# Patient Record
Sex: FEMALE | Race: WHITE | NOT HISPANIC OR LATINO | URBAN - METROPOLITAN AREA
[De-identification: names, ages, dates, MRNs, and addresses within clinical notes are randomized per-mention and may not be internally consistent; named-entity substitution may affect disease eponyms.]

---

## 2018-01-23 ENCOUNTER — HOSPITAL ENCOUNTER (OUTPATIENT)
Dept: GENERAL RADIOLOGY | Facility: HOSPITAL | Age: 53
Discharge: HOME | End: 2018-01-23

## 2020-05-20 ENCOUNTER — TELEPHONE (OUTPATIENT)
Dept: PAIN MEDICINE | Facility: CLINIC | Age: 55
End: 2020-05-20

## 2020-05-20 NOTE — TELEPHONE ENCOUNTER
Pain Management denied Patient failed UDS at previous pain Management when told they would not write for narcotics but would do injections patient left and didn't return.

## 2020-10-28 ENCOUNTER — OFFICE VISIT (OUTPATIENT)
Dept: PSYCHIATRY | Facility: CLINIC | Age: 55
End: 2020-10-28

## 2020-10-28 DIAGNOSIS — F41.1 GENERALIZED ANXIETY DISORDER: ICD-10-CM

## 2020-10-28 DIAGNOSIS — F31.81 BIPOLAR II DISORDER (HCC): Primary | ICD-10-CM

## 2020-10-28 PROCEDURE — 90792 PSYCH DIAG EVAL W/MED SRVCS: CPT | Performed by: PHYSICIAN ASSISTANT

## 2020-10-28 RX ORDER — LAMOTRIGINE 100 MG/1
100 TABLET ORAL 2 TIMES DAILY
Qty: 180 TABLET | Refills: 1 | Status: SHIPPED | OUTPATIENT
Start: 2020-10-28 | End: 2021-02-15

## 2020-10-28 RX ORDER — LAMOTRIGINE 100 MG/1
100 TABLET ORAL 2 TIMES DAILY
COMMUNITY
Start: 2020-08-13 | End: 2020-10-28 | Stop reason: SDUPTHER

## 2020-10-28 RX ORDER — LORAZEPAM 0.5 MG/1
0.5 TABLET ORAL 2 TIMES DAILY PRN
Qty: 60 TABLET | Refills: 1 | Status: SHIPPED | OUTPATIENT
Start: 2020-10-28 | End: 2020-12-28 | Stop reason: SDUPTHER
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
## 2020-10-28 NOTE — PATIENT INSTRUCTIONS

## 2020-10-29 NOTE — PROGRESS NOTES
I have reviewed the notes, assessments, and/or procedures performed byEricka Noguera, I concur with her/his documentation of Soraida Valente.

## 2020-12-28 DIAGNOSIS — F41.1 GENERALIZED ANXIETY DISORDER: ICD-10-CM

## 2020-12-28 RX ORDER — LORAZEPAM 0.5 MG/1
0.5 TABLET ORAL 2 TIMES DAILY PRN
Qty: 60 TABLET | Refills: 1 | Status: SHIPPED | OUTPATIENT
Start: 2020-12-28 | End: 2021-01-25 | Stop reason: SDUPTHER

## 2021-01-12 ENCOUNTER — TELEPHONE (OUTPATIENT)
Dept: PSYCHIATRY | Facility: CLINIC | Age: 56
End: 2021-01-12

## 2021-01-25 DIAGNOSIS — F41.1 GENERALIZED ANXIETY DISORDER: ICD-10-CM

## 2021-01-25 RX ORDER — LORAZEPAM 0.5 MG/1
0.5 TABLET ORAL 2 TIMES DAILY PRN
Qty: 60 TABLET | Refills: 0 | Status: SHIPPED | OUTPATIENT
Start: 2021-01-25 | End: 2021-03-23 | Stop reason: SDUPTHER

## 2021-02-15 DIAGNOSIS — F31.81 BIPOLAR II DISORDER (HCC): ICD-10-CM

## 2021-02-15 RX ORDER — LAMOTRIGINE 100 MG/1
TABLET ORAL
Qty: 180 TABLET | Refills: 0 | Status: SHIPPED | OUTPATIENT
Start: 2021-02-15 | End: 2021-07-13

## 2021-03-23 ENCOUNTER — OFFICE VISIT (OUTPATIENT)
Dept: PSYCHIATRY | Facility: CLINIC | Age: 56
End: 2021-03-23

## 2021-03-23 DIAGNOSIS — F25.0 SCHIZOAFFECTIVE DISORDER, BIPOLAR TYPE (HCC): Primary | Chronic | ICD-10-CM

## 2021-03-23 DIAGNOSIS — F41.1 GENERALIZED ANXIETY DISORDER: Chronic | ICD-10-CM

## 2021-03-23 PROCEDURE — 99214 OFFICE O/P EST MOD 30 MIN: CPT | Performed by: PHYSICIAN ASSISTANT

## 2021-03-23 RX ORDER — HYDROXYZINE PAMOATE 50 MG/1
50 CAPSULE ORAL 4 TIMES DAILY PRN
Qty: 120 CAPSULE | Refills: 5 | Status: SHIPPED | OUTPATIENT
Start: 2021-03-23 | End: 2021-09-13

## 2021-03-23 RX ORDER — HYDROXYZINE PAMOATE 50 MG/1
50 CAPSULE ORAL 4 TIMES DAILY
COMMUNITY
Start: 2021-02-15 | End: 2021-03-23 | Stop reason: SDUPTHER

## 2021-03-23 RX ORDER — AZELASTINE HYDROCHLORIDE 0.5 MG/ML
SOLUTION/ DROPS OPHTHALMIC
COMMUNITY
Start: 2021-03-13 | End: 2022-12-01

## 2021-03-23 RX ORDER — LORAZEPAM 0.5 MG/1
0.5 TABLET ORAL 2 TIMES DAILY PRN
Qty: 60 TABLET | Refills: 2 | Status: SHIPPED | OUTPATIENT
Start: 2021-03-23 | End: 2021-06-23

## 2021-03-23 RX ORDER — ATORVASTATIN CALCIUM 10 MG/1
10 TABLET, FILM COATED ORAL
COMMUNITY
Start: 2021-03-10

## 2021-03-23 RX ORDER — OLANZAPINE 10 MG/1
10 TABLET ORAL
Qty: 90 TABLET | Refills: 1 | Status: SHIPPED | OUTPATIENT
Start: 2021-03-23 | End: 2021-05-27 | Stop reason: SDUPTHER

## 2021-03-23 RX ORDER — OLANZAPINE 10 MG/1
10 TABLET ORAL
COMMUNITY
Start: 2021-02-28 | End: 2021-03-23 | Stop reason: SDUPTHER
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
## 2021-03-25 PROBLEM — F41.1 GENERALIZED ANXIETY DISORDER: Chronic | Status: ACTIVE | Noted: 2021-03-25

## 2021-05-27 ENCOUNTER — TELEPHONE (OUTPATIENT)
Dept: PSYCHIATRY | Facility: CLINIC | Age: 56
End: 2021-05-27

## 2021-05-27 DIAGNOSIS — F25.0 SCHIZOAFFECTIVE DISORDER, BIPOLAR TYPE (HCC): Chronic | ICD-10-CM

## 2021-05-27 RX ORDER — OLANZAPINE 10 MG/1
10 TABLET ORAL
Qty: 90 TABLET | Refills: 1 | Status: SHIPPED | OUTPATIENT
Start: 2021-05-27 | End: 2022-12-01

## 2021-06-21 DIAGNOSIS — F41.1 GENERALIZED ANXIETY DISORDER: Chronic | ICD-10-CM

## 2021-06-23 RX ORDER — LORAZEPAM 0.5 MG/1
0.5 TABLET ORAL 2 TIMES DAILY PRN
Qty: 60 TABLET | Refills: 1 | Status: SHIPPED | OUTPATIENT
Start: 2021-06-23 | End: 2021-08-11 | Stop reason: SDUPTHER

## 2021-07-13 DIAGNOSIS — F31.81 BIPOLAR II DISORDER (HCC): ICD-10-CM

## 2021-07-13 RX ORDER — LAMOTRIGINE 100 MG/1
TABLET ORAL
Qty: 180 TABLET | Refills: 0 | Status: SHIPPED | OUTPATIENT
Start: 2021-07-13 | End: 2022-12-01

## 2021-08-09 DIAGNOSIS — F31.81 BIPOLAR II DISORDER (HCC): ICD-10-CM

## 2021-08-09 RX ORDER — LAMOTRIGINE 100 MG/1
TABLET ORAL
Qty: 180 TABLET | Refills: 0 | OUTPATIENT
Start: 2021-08-09

## 2021-08-09 NOTE — TELEPHONE ENCOUNTER
Refill was sent for 90 days supply three wks ago, plus the patient does not have an appointment for follow up

## 2021-08-11 DIAGNOSIS — F41.1 GENERALIZED ANXIETY DISORDER: Chronic | ICD-10-CM

## 2021-08-11 RX ORDER — LORAZEPAM 0.5 MG/1
0.5 TABLET ORAL 2 TIMES DAILY PRN
Qty: 60 TABLET | Refills: 1 | Status: SHIPPED | OUTPATIENT
Start: 2021-08-11 | End: 2022-12-01

## 2021-09-10 DIAGNOSIS — F41.1 GENERALIZED ANXIETY DISORDER: Chronic | ICD-10-CM

## 2021-09-13 RX ORDER — HYDROXYZINE PAMOATE 50 MG/1
50 CAPSULE ORAL 4 TIMES DAILY PRN
Qty: 360 CAPSULE | Refills: 1 | Status: SHIPPED | OUTPATIENT
Start: 2021-09-13 | End: 2022-12-01

## 2021-09-30 DIAGNOSIS — F25.0 SCHIZOAFFECTIVE DISORDER, BIPOLAR TYPE (HCC): Chronic | ICD-10-CM

## 2021-09-30 RX ORDER — OLANZAPINE 10 MG/1
TABLET ORAL
Qty: 90 TABLET | Refills: 1 | OUTPATIENT
Start: 2021-09-30

## 2021-11-23 DIAGNOSIS — F41.1 GENERALIZED ANXIETY DISORDER: Chronic | ICD-10-CM

## 2021-11-25 RX ORDER — LORAZEPAM 0.5 MG/1
0.5 TABLET ORAL 2 TIMES DAILY PRN
Qty: 60 TABLET | Refills: 1 | OUTPATIENT
Start: 2021-11-25

## 2021-11-25 NOTE — TELEPHONE ENCOUNTER
Lorazepam refill denied.  She was last seen in March 2021 and no showed one appt and then canceled the next 4 appts.  She must be seen for further refills.

## 2021-11-29 DIAGNOSIS — F41.1 GENERALIZED ANXIETY DISORDER: Chronic | ICD-10-CM

## 2021-11-29 RX ORDER — LORAZEPAM 0.5 MG/1
0.5 TABLET ORAL 2 TIMES DAILY PRN
Qty: 60 TABLET | Refills: 1 | OUTPATIENT
Start: 2021-11-29

## 2021-12-01 DIAGNOSIS — F41.1 GENERALIZED ANXIETY DISORDER: Chronic | ICD-10-CM

## 2021-12-01 RX ORDER — LORAZEPAM 0.5 MG/1
0.5 TABLET ORAL 2 TIMES DAILY PRN
Qty: 60 TABLET | Refills: 1 | OUTPATIENT
Start: 2021-12-01

## 2021-12-01 NOTE — TELEPHONE ENCOUNTER
Please call this patient and let her know that she can stop requesting refills of the Lorazepam, getting repeated requests, until she is seen.  She has not been seen since March and no showed for one appt and canceled three others.  She does not currently have a follow up scheduled.  I am not refilling anything controlled until she comes in for an appt.

## 2022-11-30 ENCOUNTER — APPOINTMENT (OUTPATIENT)
Dept: GENERAL RADIOLOGY | Facility: HOSPITAL | Age: 57
End: 2022-11-30

## 2022-11-30 ENCOUNTER — APPOINTMENT (OUTPATIENT)
Dept: CT IMAGING | Facility: HOSPITAL | Age: 57
End: 2022-11-30

## 2022-11-30 ENCOUNTER — HOSPITAL ENCOUNTER (INPATIENT)
Facility: HOSPITAL | Age: 57
LOS: 1 days | Discharge: PSYCHIATRIC OR SUBSTANCE USE FACILITY | End: 2022-12-02
Attending: STUDENT IN AN ORGANIZED HEALTH CARE EDUCATION/TRAINING PROGRAM | Admitting: HOSPITALIST

## 2022-11-30 DIAGNOSIS — E87.6 HYPOKALEMIA: ICD-10-CM

## 2022-11-30 DIAGNOSIS — F15.10 AMPHETAMINE ABUSE: ICD-10-CM

## 2022-11-30 DIAGNOSIS — R41.82 ALTERED MENTAL STATUS, UNSPECIFIED ALTERED MENTAL STATUS TYPE: Primary | ICD-10-CM

## 2022-11-30 LAB
AMPHET+METHAMPHET UR QL: POSITIVE
ANION GAP SERPL CALCULATED.3IONS-SCNC: 15 MMOL/L (ref 5–15)
APAP SERPL-MCNC: <5 MCG/ML (ref 0–30)
BARBITURATES UR QL SCN: NEGATIVE
BASOPHILS # BLD AUTO: 0 10*3/MM3 (ref 0–0.2)
BASOPHILS NFR BLD AUTO: 0.5 % (ref 0–1.5)
BENZODIAZ UR QL SCN: NEGATIVE
BILIRUB UR QL STRIP: NEGATIVE
BUN SERPL-MCNC: 6 MG/DL (ref 6–20)
BUN/CREAT SERPL: 8.5 (ref 7–25)
CALCIUM SPEC-SCNC: 9.3 MG/DL (ref 8.6–10.5)
CANNABINOIDS SERPL QL: NEGATIVE
CHLORIDE SERPL-SCNC: 92 MMOL/L (ref 98–107)
CLARITY UR: CLEAR
CO2 SERPL-SCNC: 23 MMOL/L (ref 22–29)
COCAINE UR QL: NEGATIVE
COLOR UR: YELLOW
CREAT SERPL-MCNC: 0.71 MG/DL (ref 0.57–1)
DEPRECATED RDW RBC AUTO: 42.4 FL (ref 37–54)
EGFRCR SERPLBLD CKD-EPI 2021: 98.1 ML/MIN/1.73
EOSINOPHIL # BLD AUTO: 0.1 10*3/MM3 (ref 0–0.4)
EOSINOPHIL NFR BLD AUTO: 0.8 % (ref 0.3–6.2)
ERYTHROCYTE [DISTWIDTH] IN BLOOD BY AUTOMATED COUNT: 13.6 % (ref 12.3–15.4)
ETHANOL UR QL: <0.01 %
GLUCOSE SERPL-MCNC: 112 MG/DL (ref 65–99)
GLUCOSE UR STRIP-MCNC: NEGATIVE MG/DL
HCT VFR BLD AUTO: 40.1 % (ref 34–46.6)
HGB BLD-MCNC: 13.2 G/DL (ref 12–15.9)
HGB UR QL STRIP.AUTO: NEGATIVE
KETONES UR QL STRIP: NEGATIVE
LEUKOCYTE ESTERASE UR QL STRIP.AUTO: NEGATIVE
LYMPHOCYTES # BLD AUTO: 1.7 10*3/MM3 (ref 0.7–3.1)
LYMPHOCYTES NFR BLD AUTO: 18.7 % (ref 19.6–45.3)
MCH RBC QN AUTO: 30 PG (ref 26.6–33)
MCHC RBC AUTO-ENTMCNC: 33 G/DL (ref 31.5–35.7)
MCV RBC AUTO: 91 FL (ref 79–97)
METHADONE UR QL SCN: NEGATIVE
MONOCYTES # BLD AUTO: 0.9 10*3/MM3 (ref 0.1–0.9)
MONOCYTES NFR BLD AUTO: 9.9 % (ref 5–12)
NEUTROPHILS NFR BLD AUTO: 6.3 10*3/MM3 (ref 1.7–7)
NEUTROPHILS NFR BLD AUTO: 70.1 % (ref 42.7–76)
NITRITE UR QL STRIP: NEGATIVE
NRBC BLD AUTO-RTO: 0 /100 WBC (ref 0–0.2)
OPIATES UR QL: NEGATIVE
OXYCODONE UR QL SCN: NEGATIVE
PH UR STRIP.AUTO: 7 [PH] (ref 5–8)
PLATELET # BLD AUTO: 307 10*3/MM3 (ref 140–450)
PMV BLD AUTO: 7.8 FL (ref 6–12)
POTASSIUM SERPL-SCNC: 2.9 MMOL/L (ref 3.5–5.2)
PROT UR QL STRIP: NEGATIVE
RBC # BLD AUTO: 4.41 10*6/MM3 (ref 3.77–5.28)
SALICYLATES SERPL-MCNC: 2 MG/DL
SODIUM SERPL-SCNC: 130 MMOL/L (ref 136–145)
SP GR UR STRIP: <=1.005 (ref 1–1.03)
UROBILINOGEN UR QL STRIP: NORMAL
WBC NRBC COR # BLD: 9 10*3/MM3 (ref 3.4–10.8)

## 2022-11-30 PROCEDURE — 81003 URINALYSIS AUTO W/O SCOPE: CPT | Performed by: EMERGENCY MEDICINE

## 2022-11-30 PROCEDURE — 80307 DRUG TEST PRSMV CHEM ANLYZR: CPT | Performed by: EMERGENCY MEDICINE

## 2022-11-30 PROCEDURE — 74018 RADEX ABDOMEN 1 VIEW: CPT

## 2022-11-30 PROCEDURE — G0378 HOSPITAL OBSERVATION PER HR: HCPCS

## 2022-11-30 PROCEDURE — 99285 EMERGENCY DEPT VISIT HI MDM: CPT

## 2022-11-30 PROCEDURE — 25010000002 ONDANSETRON PER 1 MG: Performed by: NURSE PRACTITIONER

## 2022-11-30 PROCEDURE — 0 POTASSIUM CHLORIDE 10 MEQ/100ML SOLUTION: Performed by: NURSE PRACTITIONER

## 2022-11-30 PROCEDURE — 85025 COMPLETE CBC W/AUTO DIFF WBC: CPT | Performed by: EMERGENCY MEDICINE

## 2022-11-30 PROCEDURE — 80179 DRUG ASSAY SALICYLATE: CPT | Performed by: EMERGENCY MEDICINE

## 2022-11-30 PROCEDURE — 80048 BASIC METABOLIC PNL TOTAL CA: CPT | Performed by: EMERGENCY MEDICINE

## 2022-11-30 PROCEDURE — 80143 DRUG ASSAY ACETAMINOPHEN: CPT | Performed by: EMERGENCY MEDICINE

## 2022-11-30 PROCEDURE — 82077 ASSAY SPEC XCP UR&BREATH IA: CPT | Performed by: EMERGENCY MEDICINE

## 2022-11-30 PROCEDURE — 93005 ELECTROCARDIOGRAM TRACING: CPT

## 2022-11-30 PROCEDURE — 70450 CT HEAD/BRAIN W/O DYE: CPT

## 2022-11-30 PROCEDURE — 51701 INSERT BLADDER CATHETER: CPT

## 2022-11-30 RX ORDER — POTASSIUM CHLORIDE 7.45 MG/ML
10 INJECTION INTRAVENOUS
Status: DISCONTINUED | OUTPATIENT
Start: 2022-11-30 | End: 2022-12-02 | Stop reason: HOSPADM

## 2022-11-30 RX ORDER — NITROGLYCERIN 0.4 MG/1
0.4 TABLET SUBLINGUAL
Status: DISCONTINUED | OUTPATIENT
Start: 2022-11-30 | End: 2022-12-02 | Stop reason: HOSPADM

## 2022-11-30 RX ORDER — POTASSIUM CHLORIDE 1.5 G/1.77G
40 POWDER, FOR SOLUTION ORAL AS NEEDED
Status: DISCONTINUED | OUTPATIENT
Start: 2022-11-30 | End: 2022-12-02 | Stop reason: HOSPADM

## 2022-11-30 RX ORDER — DEXTROSE, SODIUM CHLORIDE, AND POTASSIUM CHLORIDE 5; .45; .3 G/100ML; G/100ML; G/100ML
125 INJECTION INTRAVENOUS CONTINUOUS
Status: DISCONTINUED | OUTPATIENT
Start: 2022-11-30 | End: 2022-12-02 | Stop reason: HOSPADM

## 2022-11-30 RX ORDER — SODIUM CHLORIDE 0.9 % (FLUSH) 0.9 %
10 SYRINGE (ML) INJECTION EVERY 12 HOURS SCHEDULED
Status: DISCONTINUED | OUTPATIENT
Start: 2022-11-30 | End: 2022-12-02 | Stop reason: HOSPADM

## 2022-11-30 RX ORDER — SODIUM CHLORIDE 0.9 % (FLUSH) 0.9 %
10 SYRINGE (ML) INJECTION AS NEEDED
Status: DISCONTINUED | OUTPATIENT
Start: 2022-11-30 | End: 2022-12-02 | Stop reason: HOSPADM

## 2022-11-30 RX ORDER — ONDANSETRON 2 MG/ML
4 INJECTION INTRAMUSCULAR; INTRAVENOUS EVERY 6 HOURS PRN
Status: DISCONTINUED | OUTPATIENT
Start: 2022-11-30 | End: 2022-12-02 | Stop reason: HOSPADM

## 2022-11-30 RX ORDER — POTASSIUM CHLORIDE 20 MEQ/1
40 TABLET, EXTENDED RELEASE ORAL AS NEEDED
Status: DISCONTINUED | OUTPATIENT
Start: 2022-11-30 | End: 2022-12-02 | Stop reason: HOSPADM

## 2022-11-30 RX ORDER — SODIUM CHLORIDE 9 MG/ML
40 INJECTION, SOLUTION INTRAVENOUS AS NEEDED
Status: DISCONTINUED | OUTPATIENT
Start: 2022-11-30 | End: 2022-12-02 | Stop reason: HOSPADM

## 2022-11-30 RX ADMIN — POTASSIUM CHLORIDE 10 MEQ: 7.46 INJECTION, SOLUTION INTRAVENOUS at 22:48

## 2022-11-30 RX ADMIN — DEXTROSE MONOHYDRATE, SODIUM CHLORIDE, AND POTASSIUM CHLORIDE 125 ML/HR: 50; 4.5; 2.98 INJECTION, SOLUTION INTRAVENOUS at 21:46

## 2022-11-30 RX ADMIN — POTASSIUM CHLORIDE 10 MEQ: 7.46 INJECTION, SOLUTION INTRAVENOUS at 23:56

## 2022-11-30 RX ADMIN — ONDANSETRON 4 MG: 2 INJECTION INTRAMUSCULAR; INTRAVENOUS at 22:48

## 2022-12-01 ENCOUNTER — APPOINTMENT (OUTPATIENT)
Dept: GENERAL RADIOLOGY | Facility: HOSPITAL | Age: 57
End: 2022-12-01

## 2022-12-01 LAB
ALBUMIN SERPL-MCNC: 4.2 G/DL (ref 3.5–5.2)
ALBUMIN/GLOB SERPL: 1.8 G/DL
ALP SERPL-CCNC: 112 U/L (ref 39–117)
ALT SERPL W P-5'-P-CCNC: 17 U/L (ref 1–33)
ANION GAP SERPL CALCULATED.3IONS-SCNC: 10 MMOL/L (ref 5–15)
ANION GAP SERPL CALCULATED.3IONS-SCNC: 10 MMOL/L (ref 5–15)
AST SERPL-CCNC: 22 U/L (ref 1–32)
BASOPHILS # BLD AUTO: 0 10*3/MM3 (ref 0–0.2)
BASOPHILS NFR BLD AUTO: 0.4 % (ref 0–1.5)
BILIRUB SERPL-MCNC: 0.5 MG/DL (ref 0–1.2)
BUN SERPL-MCNC: 4 MG/DL (ref 6–20)
BUN SERPL-MCNC: 5 MG/DL (ref 6–20)
BUN/CREAT SERPL: 7.1 (ref 7–25)
BUN/CREAT SERPL: 8.3 (ref 7–25)
CALCIUM SPEC-SCNC: 8.9 MG/DL (ref 8.6–10.5)
CALCIUM SPEC-SCNC: 9 MG/DL (ref 8.6–10.5)
CHLORIDE SERPL-SCNC: 103 MMOL/L (ref 98–107)
CHLORIDE SERPL-SCNC: 99 MMOL/L (ref 98–107)
CO2 SERPL-SCNC: 24 MMOL/L (ref 22–29)
CO2 SERPL-SCNC: 25 MMOL/L (ref 22–29)
CREAT SERPL-MCNC: 0.56 MG/DL (ref 0.57–1)
CREAT SERPL-MCNC: 0.6 MG/DL (ref 0.57–1)
DEPRECATED RDW RBC AUTO: 42.4 FL (ref 37–54)
EGFRCR SERPLBLD CKD-EPI 2021: 103.5 ML/MIN/1.73
EGFRCR SERPLBLD CKD-EPI 2021: 105.3 ML/MIN/1.73
EOSINOPHIL # BLD AUTO: 0.1 10*3/MM3 (ref 0–0.4)
EOSINOPHIL NFR BLD AUTO: 1.8 % (ref 0.3–6.2)
ERYTHROCYTE [DISTWIDTH] IN BLOOD BY AUTOMATED COUNT: 13.6 % (ref 12.3–15.4)
GLOBULIN UR ELPH-MCNC: 2.3 GM/DL
GLUCOSE SERPL-MCNC: 117 MG/DL (ref 65–99)
GLUCOSE SERPL-MCNC: 122 MG/DL (ref 65–99)
HCT VFR BLD AUTO: 39.6 % (ref 34–46.6)
HGB BLD-MCNC: 13.2 G/DL (ref 12–15.9)
LYMPHOCYTES # BLD AUTO: 1.3 10*3/MM3 (ref 0.7–3.1)
LYMPHOCYTES NFR BLD AUTO: 22.6 % (ref 19.6–45.3)
MCH RBC QN AUTO: 30.3 PG (ref 26.6–33)
MCHC RBC AUTO-ENTMCNC: 33.3 G/DL (ref 31.5–35.7)
MCV RBC AUTO: 91.2 FL (ref 79–97)
MONOCYTES # BLD AUTO: 0.7 10*3/MM3 (ref 0.1–0.9)
MONOCYTES NFR BLD AUTO: 11.3 % (ref 5–12)
NEUTROPHILS NFR BLD AUTO: 3.7 10*3/MM3 (ref 1.7–7)
NEUTROPHILS NFR BLD AUTO: 63.9 % (ref 42.7–76)
NRBC BLD AUTO-RTO: 0.1 /100 WBC (ref 0–0.2)
PLATELET # BLD AUTO: 287 10*3/MM3 (ref 140–450)
PMV BLD AUTO: 8.1 FL (ref 6–12)
POTASSIUM SERPL-SCNC: 4.4 MMOL/L (ref 3.5–5.2)
POTASSIUM SERPL-SCNC: 4.5 MMOL/L (ref 3.5–5.2)
PROT SERPL-MCNC: 6.5 G/DL (ref 6–8.5)
RBC # BLD AUTO: 4.34 10*6/MM3 (ref 3.77–5.28)
SODIUM SERPL-SCNC: 134 MMOL/L (ref 136–145)
SODIUM SERPL-SCNC: 137 MMOL/L (ref 136–145)
WBC NRBC COR # BLD: 5.8 10*3/MM3 (ref 3.4–10.8)

## 2022-12-01 PROCEDURE — 25010000002 LORAZEPAM PER 2 MG

## 2022-12-01 PROCEDURE — 80053 COMPREHEN METABOLIC PANEL: CPT | Performed by: NURSE PRACTITIONER

## 2022-12-01 PROCEDURE — 36415 COLL VENOUS BLD VENIPUNCTURE: CPT | Performed by: NURSE PRACTITIONER

## 2022-12-01 PROCEDURE — 99223 1ST HOSP IP/OBS HIGH 75: CPT

## 2022-12-01 PROCEDURE — 0 POTASSIUM CHLORIDE 10 MEQ/100ML SOLUTION: Performed by: NURSE PRACTITIONER

## 2022-12-01 PROCEDURE — G0378 HOSPITAL OBSERVATION PER HR: HCPCS

## 2022-12-01 PROCEDURE — 85025 COMPLETE CBC W/AUTO DIFF WBC: CPT | Performed by: NURSE PRACTITIONER

## 2022-12-01 RX ORDER — METOPROLOL TARTRATE 37.5 MG/1
1 TABLET, FILM COATED ORAL 2 TIMES DAILY
COMMUNITY

## 2022-12-01 RX ORDER — OLANZAPINE 5 MG/1
20 TABLET ORAL 2 TIMES DAILY
Status: DISCONTINUED | OUTPATIENT
Start: 2022-12-01 | End: 2022-12-01 | Stop reason: SDUPTHER

## 2022-12-01 RX ORDER — OXYBUTYNIN CHLORIDE 5 MG/1
5 TABLET ORAL DAILY
Status: DISCONTINUED | OUTPATIENT
Start: 2022-12-01 | End: 2022-12-02 | Stop reason: HOSPADM

## 2022-12-01 RX ORDER — PANTOPRAZOLE SODIUM 40 MG/1
40 TABLET, DELAYED RELEASE ORAL EVERY MORNING
Status: DISCONTINUED | OUTPATIENT
Start: 2022-12-02 | End: 2022-12-02 | Stop reason: HOSPADM

## 2022-12-01 RX ORDER — OMEPRAZOLE 20 MG/1
20 CAPSULE, DELAYED RELEASE ORAL DAILY
COMMUNITY

## 2022-12-01 RX ORDER — LORAZEPAM 1 MG/1
1 TABLET ORAL 3 TIMES DAILY PRN
COMMUNITY
End: 2022-12-02

## 2022-12-01 RX ORDER — LORAZEPAM 2 MG/ML
1 INJECTION INTRAMUSCULAR ONCE
Status: COMPLETED | OUTPATIENT
Start: 2022-12-01 | End: 2022-12-01

## 2022-12-01 RX ORDER — ATORVASTATIN CALCIUM 10 MG/1
10 TABLET, FILM COATED ORAL NIGHTLY
Status: DISCONTINUED | OUTPATIENT
Start: 2022-12-01 | End: 2022-12-02 | Stop reason: HOSPADM

## 2022-12-01 RX ORDER — LOSARTAN POTASSIUM 50 MG/1
50 TABLET ORAL
Status: DISCONTINUED | OUTPATIENT
Start: 2022-12-01 | End: 2022-12-02 | Stop reason: HOSPADM

## 2022-12-01 RX ORDER — LORAZEPAM 1 MG/1
1 TABLET ORAL 3 TIMES DAILY PRN
Status: DISCONTINUED | OUTPATIENT
Start: 2022-12-01 | End: 2022-12-02

## 2022-12-01 RX ORDER — OLANZAPINE 5 MG/1
20 TABLET ORAL 2 TIMES DAILY
Status: DISCONTINUED | OUTPATIENT
Start: 2022-12-01 | End: 2022-12-02 | Stop reason: HOSPADM

## 2022-12-01 RX ORDER — OLANZAPINE 5 MG/1
5 TABLET, ORALLY DISINTEGRATING ORAL 2 TIMES DAILY
Status: DISCONTINUED | OUTPATIENT
Start: 2022-12-01 | End: 2022-12-01

## 2022-12-01 RX ORDER — RISPERIDONE 120 MG
120 KIT SUBCUTANEOUS
COMMUNITY
End: 2022-12-02 | Stop reason: HOSPADM

## 2022-12-01 RX ORDER — HYDROCHLOROTHIAZIDE 12.5 MG/1
12.5 TABLET ORAL
Status: DISCONTINUED | OUTPATIENT
Start: 2022-12-01 | End: 2022-12-02 | Stop reason: HOSPADM

## 2022-12-01 RX ORDER — LORAZEPAM 1 MG/1
1 TABLET ORAL ONCE
Status: COMPLETED | OUTPATIENT
Start: 2022-12-01 | End: 2022-12-01

## 2022-12-01 RX ORDER — LAMOTRIGINE 100 MG/1
100 TABLET ORAL 2 TIMES DAILY
COMMUNITY
End: 2022-12-02 | Stop reason: HOSPADM

## 2022-12-01 RX ORDER — ASPIRIN 81 MG/1
81 TABLET, CHEWABLE ORAL DAILY
Status: CANCELLED | OUTPATIENT
Start: 2022-12-01

## 2022-12-01 RX ORDER — HYDROXYZINE PAMOATE 50 MG/1
50 CAPSULE ORAL 3 TIMES DAILY PRN
COMMUNITY
End: 2022-12-02 | Stop reason: HOSPADM

## 2022-12-01 RX ORDER — BUDESONIDE AND FORMOTEROL FUMARATE DIHYDRATE 80; 4.5 UG/1; UG/1
2 AEROSOL RESPIRATORY (INHALATION)
Status: DISCONTINUED | OUTPATIENT
Start: 2022-12-01 | End: 2022-12-02 | Stop reason: HOSPADM

## 2022-12-01 RX ORDER — OXYBUTYNIN CHLORIDE 5 MG/1
5 TABLET ORAL DAILY
COMMUNITY

## 2022-12-01 RX ORDER — LAMOTRIGINE 100 MG/1
100 TABLET ORAL 2 TIMES DAILY
Status: DISCONTINUED | OUTPATIENT
Start: 2022-12-01 | End: 2022-12-01

## 2022-12-01 RX ORDER — LOSARTAN POTASSIUM AND HYDROCHLOROTHIAZIDE 12.5; 5 MG/1; MG/1
1 TABLET ORAL DAILY
COMMUNITY

## 2022-12-01 RX ORDER — OLANZAPINE 20 MG/1
20 TABLET ORAL 2 TIMES DAILY
COMMUNITY
End: 2023-03-01

## 2022-12-01 RX ADMIN — Medication 10 ML: at 20:55

## 2022-12-01 RX ADMIN — OLANZAPINE 20 MG: 5 TABLET, FILM COATED ORAL at 20:55

## 2022-12-01 RX ADMIN — LOSARTAN POTASSIUM 50 MG: 50 TABLET, FILM COATED ORAL at 18:39

## 2022-12-01 RX ADMIN — LORAZEPAM 1 MG: 1 TABLET ORAL at 05:39

## 2022-12-01 RX ADMIN — LORAZEPAM 1 MG: 1 TABLET ORAL at 20:54

## 2022-12-01 RX ADMIN — OXYBUTYNIN CHLORIDE 5 MG: 5 TABLET ORAL at 18:39

## 2022-12-01 RX ADMIN — DEXTROSE MONOHYDRATE, SODIUM CHLORIDE, AND POTASSIUM CHLORIDE 125 ML/HR: 50; 4.5; 2.98 INJECTION, SOLUTION INTRAVENOUS at 15:26

## 2022-12-01 RX ADMIN — ATORVASTATIN CALCIUM 10 MG: 10 TABLET, FILM COATED ORAL at 20:55

## 2022-12-01 RX ADMIN — LORAZEPAM 1 MG: 2 INJECTION INTRAMUSCULAR; INTRAVENOUS at 15:26

## 2022-12-01 RX ADMIN — HYDROCHLOROTHIAZIDE 12.5 MG: 12.5 TABLET ORAL at 18:39

## 2022-12-01 RX ADMIN — DEXTROSE MONOHYDRATE, SODIUM CHLORIDE, AND POTASSIUM CHLORIDE 125 ML/HR: 50; 4.5; 2.98 INJECTION, SOLUTION INTRAVENOUS at 06:28

## 2022-12-01 RX ADMIN — POTASSIUM CHLORIDE 10 MEQ: 7.46 INJECTION, SOLUTION INTRAVENOUS at 03:35

## 2022-12-01 RX ADMIN — METOPROLOL TARTRATE 25 MG: 25 TABLET, FILM COATED ORAL at 20:55

## 2022-12-01 RX ADMIN — POTASSIUM CHLORIDE 10 MEQ: 7.46 INJECTION, SOLUTION INTRAVENOUS at 01:25

## 2022-12-01 RX ADMIN — POTASSIUM CHLORIDE 10 MEQ: 7.46 INJECTION, SOLUTION INTRAVENOUS at 02:29

## 2022-12-01 NOTE — ED PROVIDER NOTES
Subjective   History of Present Illness  Patient is a 57-year-old female called EMS because of anxiety.  She told EMS that around the first of every month becomes extremely anxious because of the time to do her bills.  When she first arrived to the ED and talk to the nurse she was versive.  However when I went to evaluate the patient she is obtunded and responsive only to painful stimuli.        Review of Systems  Unable to be obtained due to the patient's condition  Past Medical History:   Diagnosis Date   • ADHD    • Anxiety    • Ataxia     Gait Ataxia   • Chronic fatigue    • Chronic insomnia    • Chronic pain syndrome    • COPD (chronic obstructive pulmonary disease) (CMS/[facility])    • Depression    • Fibromyalgia    • Gender identity disorder    • H/O degenerative disc disease    • Hypertension    • Lumbago    • Osteoarthritis    • Schizoaffective disorder (CMS/[facility])    • Vitamin D deficiency        Allergies   Allergen Reactions   • Dimethyl Fumarate Swelling   • Gabapentin Mental Status Change   • Ibuprofen Nausea Only   • Tolmetin Hives       Past Surgical History:   Procedure Laterality Date   • ANKLE OPEN REDUCTION INTERNAL FIXATION  2018   • HYSTERECTOMY  1991       Family History   Problem Relation Age of Onset   • Anxiety disorder Mother    • Depression Mother        Social History     Socioeconomic History   • Marital status: Single   Tobacco Use   • Smoking status: Every Day     Packs/day: 2.00     Types: Cigarettes   • Smokeless tobacco: Never   Substance and Sexual Activity   • Alcohol use: Not Currently   • Drug use: Not Currently     Comment: Tried THC once   • Sexual activity: Defer           Objective   Physical Exam  Neurologic exam shows the patient be attended.  She has no focal neurologic deficits.  HEENT exam shows TMs to be clear.  Oropharynx comers but sclera is nonicteric.  Neck has no adenopathy JVD or bruits.  Lungs are clear.  Heart has a regular rate rhythm without murmur rub or gallop.   Chest is nontender.  Abdomen is soft nontender.  Extremity exam is unremarkable.  Procedures     My EKG interpretation shows sinus tachycardia at a rate of 110 with no acute ST change      ED Course           Results for orders placed or performed during the hospital encounter of 11/30/22   Basic Metabolic Panel    Specimen: Blood   Result Value Ref Range    Glucose 112 (H) 65 - 99 mg/dL    BUN 6 6 - 20 mg/dL    Creatinine 0.71 0.57 - 1.00 mg/dL    Sodium 130 (L) 136 - 145 mmol/L    Potassium 2.9 (L) 3.5 - 5.2 mmol/L    Chloride 92 (L) 98 - 107 mmol/L    CO2 23.0 22.0 - 29.0 mmol/L    Calcium 9.3 8.6 - 10.5 mg/dL    BUN/Creatinine Ratio 8.5 7.0 - 25.0    Anion Gap 15.0 5.0 - 15.0 mmol/L    eGFR 98.1 >60.0 mL/min/1.73   Acetaminophen Level    Specimen: Blood   Result Value Ref Range    Acetaminophen <5.0 0.0 - 30.0 mcg/mL   Ethanol    Specimen: Blood   Result Value Ref Range    Ethanol % <0.010 %   Urine Drug Screen - Urine, Clean Catch    Specimen: Urine, Clean Catch   Result Value Ref Range    Amphet/Methamphet, Screen Positive (A) Negative    Barbiturates Screen, Urine Negative Negative    Benzodiazepine Screen, Urine Negative Negative    Cocaine Screen, Urine Negative Negative    Opiate Screen Negative Negative    THC, Screen, Urine Negative Negative    Methadone Screen, Urine Negative Negative    Oxycodone Screen, Urine Negative Negative   Salicylate Level    Specimen: Blood   Result Value Ref Range    Salicylate 2.0 <=30.0 mg/dL   Urinalysis With Microscopic If Indicated (No Culture) - Straight Cath    Specimen: Straight Cath; Urine   Result Value Ref Range    Color, UA Yellow Yellow, Straw    Appearance, UA Clear Clear    pH, UA 7.0 5.0 - 8.0    Specific Gravity, UA <=1.005 1.005 - 1.030    Glucose, UA Negative Negative    Ketones, UA Negative Negative    Bilirubin, UA Negative Negative    Blood, UA Negative Negative    Protein, UA Negative Negative    Leuk Esterase, UA Negative Negative    Nitrite, UA  Negative Negative    Urobilinogen, UA 0.2 E.U./dL 0.2 - 1.0 E.U./dL   CBC Auto Differential    Specimen: Blood   Result Value Ref Range    WBC 9.00 3.40 - 10.80 10*3/mm3    RBC 4.41 3.77 - 5.28 10*6/mm3    Hemoglobin 13.2 12.0 - 15.9 g/dL    Hematocrit 40.1 34.0 - 46.6 %    MCV 91.0 79.0 - 97.0 fL    MCH 30.0 26.6 - 33.0 pg    MCHC 33.0 31.5 - 35.7 g/dL    RDW 13.6 12.3 - 15.4 %    RDW-SD 42.4 37.0 - 54.0 fl    MPV 7.8 6.0 - 12.0 fL    Platelets 307 140 - 450 10*3/mm3    Neutrophil % 70.1 42.7 - 76.0 %    Lymphocyte % 18.7 (L) 19.6 - 45.3 %    Monocyte % 9.9 5.0 - 12.0 %    Eosinophil % 0.8 0.3 - 6.2 %    Basophil % 0.5 0.0 - 1.5 %    Neutrophils, Absolute 6.30 1.70 - 7.00 10*3/mm3    Lymphocytes, Absolute 1.70 0.70 - 3.10 10*3/mm3    Monocytes, Absolute 0.90 0.10 - 0.90 10*3/mm3    Eosinophils, Absolute 0.10 0.00 - 0.40 10*3/mm3    Basophils, Absolute 0.00 0.00 - 0.20 10*3/mm3    nRBC 0.0 0.0 - 0.2 /100 WBC   ECG 12 Lead Chest Pain   Result Value Ref Range    QT Interval 306 ms     CT Head Without Contrast    Result Date: 11/30/2022   1. No acute intracranial abnormality 2. Asymmetric positioning of the mandibular condyles, evaluate for TMJ dysfunction or dislocation  Electronically Signed By-Will Gilman On:11/30/2022 8:09 PM This report was finalized on [id number] by  Will Gilman, .                                    MDM  Number of Diagnoses or Management Options  Diagnosis management comments: Patient has nonfocal neurologic exam.  CT scan of head without contrast was normal.  Structuring was positive for amphetamines.  Patient was negative for alcohol.  Metabolic panel is at baseline other than hypokalemia.  Patient will be started IV fluids with potassium replacement.  She will be brought to hospital for observation for probable drug overdose.  Speak to the on-call hospitalist.  Total critical care time 45 minutes       Amount and/or Complexity of Data Reviewed  Clinical lab tests: reviewed  Tests in  the radiology section of CPT®: reviewed  Tests in the medicine section of CPT®: reviewed    Risk of Complications, Morbidity, and/or Mortality  Presenting problems: high  Diagnostic procedures: high  Management options: high    Patient Progress  Patient progress: stable      Final diagnoses:   Altered mental status, unspecified altered mental status type   Amphetamine abuse (HCC)   Hypokalemia       ED Disposition  ED Disposition     ED Disposition   Decision to Admit    Condition   --    Comment   --             No follow-up provider specified.       Medication List      No changes were made to your prescriptions during this visit.          Robbin Bernal MD  11/30/22 2050

## 2022-12-01 NOTE — CASE MANAGEMENT/SOCIAL WORK
Social Work Assessment  [facility]     Patient Name: Soraida Valente  MRN: [id number]  Today's Date: 12/1/2022    Admit Date: 11/30/2022       Discharge Plan     Row Name 12/01/22 1600       Plan    Plan D/C Plan: IP Psych. [facility] Order (72 hours) initated 12/01/22 at 4:02pm (expires 12/06/22 at 4:01pm) anticipate [facility] referral once medically stable.    Plan Comments Consulted with psych APRN and patient will be on a 72 hour hold. LSW met with patient bedside. Patient was very anxious and crying while speaking to LSW. Patient has suicidal ideations and a suicide plan. Discussed w/ pt. level of care needs.  Patient initially stated she did not want to go to [facility], we confirmed she will go there depending on acceptance and the attending. Permission obtained to speak to emergency contact. Copy of LINDSAY faxed to unit 3C for hard chart.                Met with patient in room wearing PPE: mask, face shield/goggles, gloves, gown.    Maintained distance greater than six feet and spent less than 15 minutes in the room.      TONI Dee, MSW    Phone: [phone]  Cell: [phone]  Fax: [phone]  [email or web address]

## 2022-12-01 NOTE — PLAN OF CARE
Goal Outcome Evaluation:  Plan of Care Reviewed With: patient           Outcome Evaluation: pt has had no complaints since being on the floor. pt in bed resting. will continue to monitor

## 2022-12-01 NOTE — ED NOTES
Answered pts call light and ambulated to [facility]. Pt reported SI with plan. I packed pts clothing (shirts, jacket, pants, belt, shoes) in a belonging bag with a pt sticker and gave items to security to be locked up in the closet. Hold nurse made aware.

## 2022-12-01 NOTE — CONSULTS
"  Referring Provider: BHAVANA Spivey  Reason for Consultation:       Chief complaint: \"I don't deserve to live anymore.\"    Subjective .     History of present illness:  The patient is a 57 y.o. female who was admitted secondary to anxiety. The patient has a past medical history of anxiety, panic attacks, schizophrenia and bipolar disorder.     Psych was consulted by BHAVANA Spivey for anxiety and suicidal ideation.     At the time of exam, the patient was alert and oriented, in bed. She was very tearful and tremulous. The patient states she sees Dr. Perdomo at [facility] and he prescribes her olanzapine, risperidone injections once a month, and lorazepam. She says she ran out of her medications a couple months ago and missed her follow up appointment. She did however get her risperidone injection about a week ago she says.     When I asked her what brought her to the hospital, she said, \"I don't deserve to live anymore.\" She said she felt like all she had done was hurt the people in her life and she didn't deserve to be here anymore. I asked her to elaborate, or if something went on recently, and she says she was inpatient at [facility] about 2 month ago. She feels like her wife is supportive, but she also feels like she is letting her down.     She says she is diagnosed with schizophrenia, bipolar disorder, anxiety, and panic attacks. She does endorse auditory and visual hallucinations. She says she hears voices and sees people. She feels like she is paranoid all the time, and like everyone is out to get her.     She does report she is having active suicidal thoughts at time of the exam. She said her plan is to cut her wrists. I asked her if she would act on that plan, and she confirmed that if she were to go home right now, she would definitely act on that plan and cut her wrists. She said she did not want to be inpatient right now, and expressed desire not to go back to see Dr. Escobar at [facility]. " I explained to her right now that the safest option for her is to be admitted to an inpatient psychiatric facility to be stabilized and get back on her medication.    By the end of the interview, the patient was having a lot of anxiety, and near panic level, so I placed an order for IV lorazepam to be given.     Past psychiatric history: bipolar disorder, schizophrenia, anxiety, panic disorder, substance use (methamphetamine).   Family history: None pertinent.   Social history: Patient lives with her wife. She is a smoker, she does not endorse alcohol use. She does state she uses methamphetamine occasionally, once or twice every 2-3 weeks. She states she uses more when she is out of her medication. Her urine drug screen was positive for amphetamines this admission.     Review of Systems   All systems were reviewed and negative except for:  Behavioral/Psych: positive for  anxiety, hallucinations, sleep disturbance, suicidal ideations and unstable mood    The following portions of the patient's history were reviewed and updated as appropriate: allergies, current medications, past family history, past medical history, past social history, past surgical history and problem list.    History      Past Medical History:   Diagnosis Date   • ADHD    • Anxiety    • Ataxia     Gait Ataxia   • Chronic fatigue    • Chronic insomnia    • Chronic pain syndrome    • COPD (chronic obstructive pulmonary disease) ([facility])    • Depression    • Fibromyalgia    • Gender identity disorder    • H/O degenerative disc disease    • Hypertension    • Lumbago    • Osteoarthritis    • Schizoaffective disorder ([facility])    • Vitamin D deficiency           Family History   Problem Relation Age of Onset   • Anxiety disorder Mother    • Depression Mother         Social History     Tobacco Use   • Smoking status: Every Day     Packs/day: 1.00     Types: Cigarettes   • Smokeless tobacco: Never   Substance Use Topics   • Alcohol use: Not Currently   • Drug  "use: Not Currently     Comment: Tried THC once        (Not in a hospital admission)       Scheduled Meds:  OLANZapine zydis, 5 mg, Oral, BID  sodium chloride, 10 mL, Intravenous, Q12H         Continuous Infusions:  dextrose 5 % and sodium chloride 0.45 % with KCl 40 mEq/L, 125 mL/hr, Last Rate: 125 mL/hr (12/01/22 1526)        PRN Meds:  •  LORazepam  •  nitroglycerin  •  ondansetron  •  potassium chloride **OR** potassium chloride **OR** potassium chloride  •  [COMPLETED] Insert Peripheral IV **AND** sodium chloride  •  sodium chloride  •  sodium chloride      Allergies:  Dimethyl fumarate, Gabapentin, Ibuprofen, and Tolmetin      Objective     Vital Signs   BP 92/60   Pulse 90   Temp 99 °F (37.2 °C) (Rectal)   Resp 24   Ht 172.7 cm (68\")   Wt 77.1 kg (170 lb)   SpO2 93%   BMI 25.85 kg/m²     Physical Exam:    Musculoskeletal:   Muscle strength and tone: normal  Abnormal Movements: none noted  Gait: Unable to assess as patient in bed.      General Appearance:    In bed, anxious and tearful.      Mental Status Exam:   Hygiene:   good  Cooperation:  Cooperative  Eye Contact:  Poor  Behavior and Psychomotor Activity: Restless  Speech:  Normal  Mood: depressed, anxious  Affect:  Blunted  Thought Process:  Linear  Associations: Intact   Thought Content:  patient endorses visual and auditory hallucinations  Language: normal  Suicidal Ideations:  Suicidal Ideation and Suicidal plan  Homicidal:  None  Hallucinations:  Auditory and Visual  Delusion:  Paranoid  Orientation:  To person, Place and Time  Memory:  Intact  Concentration and computation: Fair  Attention span: Short  Fund of knowledge: Lacking  Reliability:  fair  Insight:  Poor  Judgement:  Poor  Impulse Control:  Poor      Medications and allergies reviewed.    Result Review:  I have personally reviewed the results from the time of this admission to 12/1/2022 15:57 EST and agree with these findings:  [x]  Laboratory  []  Microbiology  []  Radiology  []  " EKG/Telemetry   []  Cardiology/Vascular   []  Pathology  []  Old records  []  Other:      Assessment & Plan       Altered mental status, unspecified altered mental status type     Assessment: schizoaffective disorder, depressive, generalized anxiety disorder, panic disorder    Treatment Plan: The patient presents with signs and symptoms of schizoaffective disorder with depression. At the time of assessment, the patient is a danger to self due to her active suicidal ideation with a plan to cut her wrists. At this time, the patient requires inpatient psychiatric admission for stabilization of her disorder and her medications.     Suicide precautions initiated and a 72 hour hold was placed and signed from Judge Ivan expiring 12/6/22 at 4:01pm.    The patients home olanzapine dose was ordered, as well as her previous dose of lorazepam for anxiety.     Continue supportive treatment until patient can be transferred to dedicated psychiatric facility.     Will continue to follow until transfer.     Treatment Plan discussed with: Patient and nursing staff, social work.    I discussed the patients findings and my recommendations with patient, nursing staff and social work    I have reviewed and approved the behavioral health treatment plans and problem list. Yes     Thank you for the consult   Referring MD has access to consult report and progress notes in EMR     BHAVANA Guerrero  12/01/22  15:57 EST

## 2022-12-01 NOTE — PROGRESS NOTES
[facility] Daily Progress Note    Patient Name: Soraida Valente  : 1965  MRN: [id number]  Primary Care Physician:  Carlo Gutierrez MD (Inactive)  Date of admission: 2022      Subjective      Chief Complaint: Altered mental status    Patient seen examined at bedside.  She is groggy after receiving some benzodiazepines earlier today.  She is AOx3.  No SI or HI at this time.    ROS  Negative apart for HPI      Objective      Vitals:   Temp:  [99 °F (37.2 °C)] 99 °F (37.2 °C)  Heart Rate:  [] 83  Resp:  [24] 24  BP: (103-130)/(65-90) 112/72    Physical Exam   Head atraumatic normocephalic  Neck supple  Heart S1-S2, tachycardic, regular no extra heart sounds  Lungs clear to auscultation bilaterally  Abdomen soft, bowel sounds present, slightly distended, nontender  Lower extremities no edema  Neuro groggy, AOx3  Eyes pupils dilated bilaterally         Result Review    Result Review:  I have personally reviewed the results from the time of this admission to 2022 09:42 EST and agree with these findings:  [x]  Laboratory  []  Microbiology  [x]  Radiology  [x]  EKG/Telemetry   []  Cardiology/Vascular   []  Pathology  []  Old records  []  Other:          Assessment & Plan      Brief Patient Summary:  Soraida Valente is a 57 y.o. female who presents with altered mental status status post taking illicit drugs    sodium chloride, 10 mL, Intravenous, Q12H       dextrose 5 % and sodium chloride 0.45 % with KCl 40 mEq/L, 125 mL/hr, Last Rate: 125 mL/hr (22 0837)         Active Hospital Problems:  Active Hospital Problems    Diagnosis    • **Altered mental status, unspecified altered mental status type      Plan:   Altered mental status  Resolved  Likely due to illicit drug use  Urine drug screen positive for amphetamines  Head CT negative    Illicit drug use  U tox as above  Psych consult  No HI or SI    Tachycardia  Resolved  Sinus tachycardia  Likely  due to amphetamines    Amphetamine abuse  Use benzodiazepines as needed for agitation    TMG dislocation?  Suggestion on CT head  Will obtain dedicated x-rays    DVT PUD prophylaxis    Plan monitor patient, psych evaluation, possible discharge in next 24 to 48 hours.    DVT prophylaxis:  Mechanical DVT prophylaxis orders are present.    CODE STATUS:    Code Status (Patient has no pulse and is not breathing): CPR (Attempt to Resuscitate)  Medical Interventions (Patient has pulse or is breathing): Full Support  Release to patient: Routine Release      Disposition:  I expect patient to be discharged 24 hours.      Electronically signed by Rajesh Pina MD, 12/01/22, 09:42 EST.  [facility]

## 2022-12-01 NOTE — H&P
"    [facility] Medicine Services  History & Physical    Patient Name: Soraida Valente  : 1965  MRN: [id number]  Primary Care Physician:  Carlo Gutierrez MD (Inactive)  Date of admission: 2022  Date and Time of Service: 22 at 2130    Subjective      Chief Complaint: AMS    History of Present Illness: Soraida Valente is a 57 y.o. female with past medical history of  Anxiety, panic attacks, and bipolar disorderwho presented to [facility] with initial c/o anxiety. Per ER report, she had told EMS that around first of every month becomes extremely anxious over bills. She was responsive upon entering ER but upon evaluation was obtunded and responsive to only painful stimuli.     Upon my exam patient is alert and tearful.  She is tremulous with involuntary movement of twitching her foot and jaw.  She reports is having \"suicidal thoughts\".  She reports ran out of her lorazepam a couple months ago and missed follow-up appointment with psychiatry.  She is only on verapamil at home.  Has ran out her Zyprexa also takes Risperdal injection once a month and took about a week ago.  She is complaining of abdominal pain and distention and mild nausea.  Patient admits to using inhaled methamphetamine a couple times a week.  Patient reports \"we will need to come up with new plan as I have ran out of medications\"    CT was normal. Labs positive for hypokalemia and hyponatremia drug screen positive for amphetamine/methamphetamine.  Inspect reviewed and patient previously on lorazepam 1 mg that was filled on 2022 and 2022 quantity of 60 each time.  Benzodiazepines were negative and the drug screen.    Review of Systems   Constitutional: Positive for diaphoresis and malaise/fatigue.        Tearful and tremulous   HENT: Negative.    Eyes: Negative.    Cardiovascular: Negative.    Respiratory: Positive for shortness of breath.    Skin: Negative.    Musculoskeletal: " Negative.    Gastrointestinal: Positive for bloating, abdominal pain and nausea.   Genitourinary: Negative.    Neurological:        Involuntary twitching   Psychiatric/Behavioral: Positive for suicidal ideas. The patient is nervous/anxious.         Personal History     Past Medical History:   Diagnosis Date   • ADHD    • Anxiety    • Ataxia     Gait Ataxia   • Chronic fatigue    • Chronic insomnia    • Chronic pain syndrome    • COPD (chronic obstructive pulmonary disease) (CMS/HCC)    • Depression    • Fibromyalgia    • Gender identity disorder    • H/O degenerative disc disease    • Hypertension    • Lumbago    • Osteoarthritis    • Schizoaffective disorder (CMS/[facility])    • Vitamin D deficiency        Past Surgical History:   Procedure Laterality Date   • ANKLE OPEN REDUCTION INTERNAL FIXATION  2018   • HYSTERECTOMY  1991       Family History: family history includes Anxiety disorder in her mother; Depression in her mother. Otherwise pertinent FHx was reviewed and not pertinent to current issue.    Social History:  reports that she has been smoking. She has been smoking an average of 2 packs per day. She has never used smokeless tobacco. She reports that she does not currently use alcohol. She reports that she does not currently use drugs.    Home Medications:  Prior to Admission Medications     Prescriptions Last Dose Informant Patient Reported? Taking?    albuterol (PROVENTIL) (2.5 MG/3ML) 0.083% nebulizer solution   Yes No    ALBUTEROL SULFATE (2.5 MG/3ML) 0.083% NEBU    albuterol sulfate HFA (PROAIR HFA) 108 (90 Base) MCG/ACT inhaler   Yes No    PROAIR  (90 Base) MCG/ACT AERS    aspirin 81 MG chewable tablet   Yes No    ASPIRIN 81 MG CHEW    atorvastatin (LIPITOR) 10 MG tablet   Yes No    Take 10 mg by mouth every night at bedtime.    azelastine (OPTIVAR) 0.05 % ophthalmic solution   Yes No    INSTILL 1 DROP INTO BOTH EYES TWICE A DAY    Breo Ellipta 100-25 MCG/INH inhaler   Yes No    Inhale 1 puff Daily.     hydrOXYzine pamoate (VISTARIL) 50 MG capsule   No No    TAKE 1 CAPSULE BY MOUTH 4 (FOUR) TIMES A DAY AS NEEDED FOR ANXIETY    lamoTRIgine (LaMICtal) 100 MG tablet   No No    TAKE 1 TABLET BY MOUTH TWICE A DAY    LORazepam (ATIVAN) 0.5 MG tablet   No No    Take 1 tablet by mouth 2 (Two) Times a Day As Needed for Anxiety.    OLANZapine (zyPREXA) 10 MG tablet   No No    Take 1 tablet by mouth every night at bedtime.            Allergies:  Allergies   Allergen Reactions   • Dimethyl Fumarate Swelling   • Gabapentin Mental Status Change   • Ibuprofen Nausea Only   • Tolmetin Hives       Objective      Vitals:   Temp:  [99 °F (37.2 °C)] 99 °F (37.2 °C)  Heart Rate:  [130] 130  Resp:  [24] 24  BP: (130)/(90) 130/90    Physical Exam  Constitutional:       Appearance: She is ill-appearing and diaphoretic.   Eyes:      Pupils: Pupils are equal, round, and reactive to light.   Cardiovascular:      Rate and Rhythm: Regular rhythm. Tachycardia present.   Abdominal:      General: There is distension.      Tenderness: There is abdominal tenderness.   Musculoskeletal:         General: Normal range of motion.      Cervical back: Normal range of motion.   Skin:     General: Skin is warm.   Neurological:      General: No focal deficit present.      Comments: Alert X2, impaired thought process    Psychiatric:      Comments: Tearful, voicing having SI, anxious          Result Review    Result Review:  I have personally reviewed the results from the time of this admission to 11/30/2022 22:18 EST and agree with these findings:  [x]  Laboratory  []  Microbiology  [x]  Radiology  [x]  EKG/Telemetry   []  Cardiology/Vascular   []  Pathology  [x]  Old records  []  Other:  Most notable findings include: Positive for amphetamines and tox screen and hypokalemia with potassium 2.9 hyponatremia sodium 130  No leukocytosis  No MULUGETA  UA clear      Assessment & Plan        Active Hospital Problems:  Active Hospital Problems    Diagnosis    •  **Altered mental status, unspecified altered mental status type      Plan:   AMS 2/2 Unintentional overdose  - pt took inhaled methamphetamine due to severe anxiety  - Pt reports having suicidal thoughts but does not have current plan but considering making one  - psychiatry consult  -sitter at bedside  - continue telemetry for mild tachycardia   - currently on room air  - zofran for nausea     Hypokalemia  - replace per protocol    Hyponatremia  -   - repeat in AM     Abdominal pain  - stat KUB      DVT prophylaxis:  Mechanical DVT prophylaxis orders are present.    CODE STATUS:       Admission Status:  I believe this patient meets observation status.    I discussed the patient's findings and my recommendations with patient.    This patient has been examined wearing appropriate Personal Protective Equipment     Signature: Electronically signed by BHAVANA Spivey, 11/30/22, 22:18 EST.  [facility] Hospitalist Team

## 2022-12-02 ENCOUNTER — APPOINTMENT (OUTPATIENT)
Dept: MRI IMAGING | Facility: HOSPITAL | Age: 57
End: 2022-12-02

## 2022-12-02 VITALS
HEART RATE: 91 BPM | DIASTOLIC BLOOD PRESSURE: 78 MMHG | HEIGHT: 68 IN | OXYGEN SATURATION: 94 % | BODY MASS INDEX: 28.13 KG/M2 | RESPIRATION RATE: 22 BRPM | TEMPERATURE: 98.1 F | SYSTOLIC BLOOD PRESSURE: 112 MMHG | WEIGHT: 185.63 LBS

## 2022-12-02 LAB
ALBUMIN SERPL-MCNC: 3.8 G/DL (ref 3.5–5.2)
ALBUMIN/GLOB SERPL: 1.6 G/DL
ALP SERPL-CCNC: 113 U/L (ref 39–117)
ALT SERPL W P-5'-P-CCNC: 16 U/L (ref 1–33)
ANION GAP SERPL CALCULATED.3IONS-SCNC: 11 MMOL/L (ref 5–15)
AST SERPL-CCNC: 20 U/L (ref 1–32)
BILIRUB SERPL-MCNC: 0.3 MG/DL (ref 0–1.2)
BUN SERPL-MCNC: 6 MG/DL (ref 6–20)
BUN/CREAT SERPL: 10.2 (ref 7–25)
CALCIUM SPEC-SCNC: 9 MG/DL (ref 8.6–10.5)
CHLORIDE SERPL-SCNC: 102 MMOL/L (ref 98–107)
CO2 SERPL-SCNC: 24 MMOL/L (ref 22–29)
CREAT SERPL-MCNC: 0.59 MG/DL (ref 0.57–1)
DEPRECATED RDW RBC AUTO: 44.2 FL (ref 37–54)
EGFRCR SERPLBLD CKD-EPI 2021: 104 ML/MIN/1.73
ERYTHROCYTE [DISTWIDTH] IN BLOOD BY AUTOMATED COUNT: 14 % (ref 12.3–15.4)
GLOBULIN UR ELPH-MCNC: 2.4 GM/DL
GLUCOSE SERPL-MCNC: 98 MG/DL (ref 65–99)
HCT VFR BLD AUTO: 41.1 % (ref 34–46.6)
HGB BLD-MCNC: 13.3 G/DL (ref 12–15.9)
MCH RBC QN AUTO: 29.9 PG (ref 26.6–33)
MCHC RBC AUTO-ENTMCNC: 32.4 G/DL (ref 31.5–35.7)
MCV RBC AUTO: 92.3 FL (ref 79–97)
PLATELET # BLD AUTO: 280 10*3/MM3 (ref 140–450)
PMV BLD AUTO: 8.2 FL (ref 6–12)
POTASSIUM SERPL-SCNC: 4.3 MMOL/L (ref 3.5–5.2)
PROT SERPL-MCNC: 6.2 G/DL (ref 6–8.5)
RBC # BLD AUTO: 4.45 10*6/MM3 (ref 3.77–5.28)
SARS-COV-2 RNA PNL SPEC NAA+PROBE: NOT DETECTED
SODIUM SERPL-SCNC: 137 MMOL/L (ref 136–145)
WBC NRBC COR # BLD: 8 10*3/MM3 (ref 3.4–10.8)

## 2022-12-02 PROCEDURE — 97162 PT EVAL MOD COMPLEX 30 MIN: CPT

## 2022-12-02 PROCEDURE — 94799 UNLISTED PULMONARY SVC/PX: CPT

## 2022-12-02 PROCEDURE — 87635 SARS-COV-2 COVID-19 AMP PRB: CPT | Performed by: HOSPITALIST

## 2022-12-02 PROCEDURE — 80053 COMPREHEN METABOLIC PANEL: CPT | Performed by: HOSPITALIST

## 2022-12-02 PROCEDURE — 85027 COMPLETE CBC AUTOMATED: CPT | Performed by: HOSPITALIST

## 2022-12-02 PROCEDURE — 36415 COLL VENOUS BLD VENIPUNCTURE: CPT | Performed by: HOSPITALIST

## 2022-12-02 PROCEDURE — 99233 SBSQ HOSP IP/OBS HIGH 50: CPT | Performed by: PSYCHIATRY & NEUROLOGY

## 2022-12-02 RX ORDER — RISPERIDONE 0.5 MG/1
1 TABLET, ORALLY DISINTEGRATING ORAL ONCE
Status: COMPLETED | OUTPATIENT
Start: 2022-12-02 | End: 2022-12-02

## 2022-12-02 RX ORDER — LORAZEPAM 1 MG/1
1 TABLET ORAL 4 TIMES DAILY PRN
Status: DISCONTINUED | OUTPATIENT
Start: 2022-12-02 | End: 2022-12-02 | Stop reason: HOSPADM

## 2022-12-02 RX ADMIN — Medication 10 ML: at 07:56

## 2022-12-02 RX ADMIN — RISPERIDONE 1 MG: 0.5 TABLET, ORALLY DISINTEGRATING ORAL at 17:30

## 2022-12-02 RX ADMIN — OLANZAPINE 20 MG: 5 TABLET, FILM COATED ORAL at 07:55

## 2022-12-02 RX ADMIN — HYDROCHLOROTHIAZIDE 12.5 MG: 12.5 TABLET ORAL at 07:55

## 2022-12-02 RX ADMIN — LOSARTAN POTASSIUM 50 MG: 50 TABLET, FILM COATED ORAL at 07:56

## 2022-12-02 RX ADMIN — DEXTROSE MONOHYDRATE, SODIUM CHLORIDE, AND POTASSIUM CHLORIDE 125 ML/HR: 50; 4.5; 2.98 INJECTION, SOLUTION INTRAVENOUS at 06:21

## 2022-12-02 RX ADMIN — METOPROLOL TARTRATE 25 MG: 25 TABLET, FILM COATED ORAL at 07:56

## 2022-12-02 RX ADMIN — OXYBUTYNIN CHLORIDE 5 MG: 5 TABLET ORAL at 07:56

## 2022-12-02 RX ADMIN — BUDESONIDE AND FORMOTEROL FUMARATE DIHYDRATE 2 PUFF: 80; 4.5 AEROSOL RESPIRATORY (INHALATION) at 08:10

## 2022-12-02 RX ADMIN — LORAZEPAM 1 MG: 1 TABLET ORAL at 14:54

## 2022-12-02 RX ADMIN — LORAZEPAM 1 MG: 1 TABLET ORAL at 07:56

## 2022-12-02 RX ADMIN — PANTOPRAZOLE SODIUM 40 MG: 40 TABLET, DELAYED RELEASE ORAL at 06:16

## 2022-12-02 NOTE — CASE MANAGEMENT/SOCIAL WORK
Social Work Assessment  [facility]     Patient Name: Soraida Valente  MRN: [id number]  Today's Date: 12/2/2022    Admit Date: 11/30/2022     Discharge Plan     Row Name 12/02/22 1623       Plan    Plan DC Plan: [facility] accepted, bed 404A. Call report to [phone] unit #4.  [facility] start 12/1 @ 4:02pm (expires 12/6 @ 4:01pm).  transport requested 12/2 @ 4:32pm    Plan Comments Negative COVID test faxed to [facility]. Obtained signed updated [facility] warrant transport page signed by  and faxed back by 's office. Documents faxed & [facility] rep updated via phone. Obtained details as above. Transport requested from [facility] department d/t hold. Nursing/unit staff updated on d/c plan.           Phone communication or documentation only - no physical contact with patient or family.  TONI Narayan    Phone: [phone]  Cell: [phone]  Fax: [phone]  [email or web address]

## 2022-12-02 NOTE — DISCHARGE PLACEMENT REQUEST
"Soraida Valente (57 y.o. Female)     Date of Birth   1965    Social Security Number       Address   [address] [place] [address]    Home Phone   [phone]    MRN   [id number]       Rastafari   Unknown    Marital Status   Single                            Admission Date   11/30/22    Admission Type   Emergency    Admitting Provider   Pita Trevino DO    Attending Provider   Rajesh Pina MD    Department, Room/Bed   [facility] 3C MEDICAL INPATIENT, 378/1       Discharge Date       Discharge Disposition       Discharge Destination                               Attending Provider: Rajesh Pina MD    Allergies: Dimethyl Fumarate, Gabapentin, Ibuprofen, Tolmetin    Isolation: None   Infection: None   Code Status: CPR    Ht: 172.7 cm (68\")   Wt: 84.2 kg (185 lb 10 oz)    Admission Cmt: None   Principal Problem: Altered mental status, unspecified altered mental status type [R41.82]                 Active Insurance as of 11/30/2022     Primary Coverage     Payor Plan Insurance Group Employer/Plan Group    MEDICARE MEDICARE A & B      Payor Plan Address Payor Plan Phone Number Payor Plan Fax Number Effective Dates    [address] [phone]  8/1/1999 - None Entered    [facility] [id number]       Subscriber Name Subscriber Birth Date Member ID       SORAIDA VALENTE 1965 [id number]           Secondary Coverage     Payor Plan Insurance Group Employer/Plan Group    [place]      Payor Plan Address Payor Plan Phone Number Payor Plan Fax Number Effective Dates    [address]   10/28/2020 - None Entered    [place] [address]       Subscriber Name Subscriber Birth Date Member ID       SORAIDA VALENTE 1965 [id number]                 Emergency Contacts      (Rel.) Home Phone Work Phone Mobile Phone    Delilah Valente (Spouse) [phone] -- --              "

## 2022-12-02 NOTE — THERAPY EVALUATION
Patient Name: Soraida Valente  : 1965    MRN: [id number]                              Today's Date: 2022       Admit Date: 2022    Visit Dx:     ICD-10-CM ICD-9-CM   1. Altered mental status, unspecified altered mental status type  R41.82 780.97   2. Amphetamine abuse (HCC)  F15.10 305.70   3. Hypokalemia  E87.6 276.8     Patient Active Problem List   Diagnosis   • Schizoaffective disorder (HCC)   • Generalized anxiety disorder   • Altered mental status, unspecified altered mental status type     Past Medical History:   Diagnosis Date   • ADHD    • Anxiety    • Ataxia     Gait Ataxia   • Chronic fatigue    • Chronic insomnia    • Chronic pain syndrome    • COPD (chronic obstructive pulmonary disease) ([facility])    • Depression    • Fibromyalgia    • Gender identity disorder    • H/O degenerative disc disease    • Hypertension    • Lumbago    • Osteoarthritis    • Schizoaffective disorder ([facility])    • Vitamin D deficiency      Past Surgical History:   Procedure Laterality Date   • ANKLE OPEN REDUCTION INTERNAL FIXATION     • HYSTERECTOMY        General Information     Row Name 22 1325          Physical Therapy Time and Intention    Document Type evaluation  -EL     Mode of Treatment individual therapy;physical therapy  -EL     Row Name 22 1325          General Information    Prior Level of Function independent:;all household mobility;ADL's  -EL     Row Name 22 1325          Living Environment    People in Home other (see comments)  Roommate  -EL     Row Name 22 1325          Home Main Entrance    Number of Stairs, Main Entrance none  -EL     Row Name 22 1325          Stairs Within Home, Primary    Number of Stairs, Within Home, Primary none  -EL     Row Name 22 1325          Cognition    Orientation Status (Cognition) oriented x 4  -EL     Row Name 22 1325          Safety Issues, Functional Mobility    Impairments Affecting Function (Mobility)  balance;strength  -EL           User Key  (r) = Recorded By, (t) = Taken By, (c) = Cosigned By    Initials Name Provider Type    Dain Curran PT Physical Therapist               Mobility     Row Name 12/02/22 1326          Bed Mobility    Bed Mobility bed mobility (all) activities  -EL     All Activities, [place] (Bed Mobility) modified independence  -EL     Assistive Device (Bed Mobility) bed rails  -EL     Row Name 12/02/22 1326          Sit-Stand Transfer    Sit-Stand [place] (Transfers) contact guard  -EL     Row Name 12/02/22 1326          Gait/Stairs (Locomotion)    [place] Level (Gait) minimum assist (75% patient effort)  -EL     Assistive Device (Gait) --  Used IV pole for stability, would benefit from RWx  -EL     Distance in Feet (Gait) 35  -EL     Deviations/Abnormal Patterns (Gait) gait speed decreased  -EL     Comment, (Gait/Stairs) Mild lateral sway but no significant LOB  -EL           User Key  (r) = Recorded By, (t) = Taken By, (c) = Cosigned By    Initials Name Provider Type    Dain Curran PT Physical Therapist               Obj/Interventions     Row Name 12/02/22 1328          Range of Motion Comprehensive    General Range of Motion bilateral lower extremity ROM WFL  -EL     Row Name 12/02/22 1328          Strength Comprehensive (MMT)    General Manual Muscle Testing (MMT) Assessment lower extremity strength deficits identified  -EL     Comment, General Manual Muscle Testing (MMT) Assessment BLE 4-/5 gross  -EL     Row Name 12/02/22 1328          Balance    Balance Assessment sitting static balance;standing static balance;standing dynamic balance  -EL     Static Sitting Balance independent  -EL     Static Standing Balance contact guard  -EL     Dynamic Standing Balance minimal assist  -EL           User Key  (r) = Recorded By, (t) = Taken By, (c) = Cosigned By    Initials Name Provider Type    Dain Curran PT Physical Therapist               Goals/Plan     Row Name 12/02/22  1335          Bed Mobility Goal 1 (PT)    Activity/Assistive Device (Bed Mobility Goal 1, PT) bed mobility activities, all  -EL     [place] Level/Cues Needed (Bed Mobility Goal 1, PT) independent  -EL     Time Frame (Bed Mobility Goal 1, PT) long term goal (LTG);2 weeks  -EL     Row Name 12/02/22 1335          Transfer Goal 1 (PT)    Activity/Assistive Device (Transfer Goal 1, PT) transfers, all;walker, rolling  -EL     [place] Level/Cues Needed (Transfer Goal 1, PT) modified independence  -EL     Time Frame (Transfer Goal 1, PT) long term goal (LTG);2 weeks  -EL     Row Name 12/02/22 1335          Gait Training Goal 1 (PT)    Activity/Assistive Device (Gait Training Goal 1, PT) gait (walking locomotion);walker, rolling  -EL     [place] Level (Gait Training Goal 1, PT) modified independence  -EL     Distance (Gait Training Goal 1, PT) 200  -EL     Time Frame (Gait Training Goal 1, PT) long term goal (LTG);2 weeks  -EL     Row Name 12/02/22 1335          Therapy Assessment/Plan (PT)    Planned Therapy Interventions (PT) balance training;neuromuscular re-education;bed mobility training;transfer training;gait training;patient/family education;strengthening;stair training  -EL           User Key  (r) = Recorded By, (t) = Taken By, (c) = Cosigned By    Initials Name Provider Type    Dain Curran, PT Physical Therapist               Clinical Impression     Row Name 12/02/22 1329          Pain    Pretreatment Pain Rating 0/10 - no pain  -EL     Posttreatment Pain Rating 0/10 - no pain  -EL     Row Name 12/02/22 1329          Plan of Care Review    Plan of Care Reviewed With patient  -EL     Outcome Evaluation Pt is a 56 YO F admitted with severe anxiety, amephetamine usage, became obtunded in ER. Pt states she lives with a roommate, typically is independent with all ADLs,  ambulates without an AD and states she has had no recent falls. Pt this date with impaired balance, but no significant LOB, would  benefit from RWx usage. Pt ambulated in room with MIN A and has weakness in BLE. Per social work note, anticipate inpatient psych once medically stable. Pt with minor mobility deficits, will continue to follow while admitted, but anticipate pt is safe to d/c to IP psych facility.  -EL     Row Name 12/02/22 1326          Therapy Assessment/Plan (PT)    Rehab Potential (PT) good, to achieve stated therapy goals  -EL     Criteria for Skilled Interventions Met (PT) yes  -EL     Therapy Frequency (PT) 3 times/wk  -EL     Predicted Duration of Therapy Intervention (PT) Until d/c  -EL     Row Name 12/02/22 1320          Vital Signs    O2 Delivery Pre Treatment room air  -EL     O2 Delivery Intra Treatment room air  -EL     O2 Delivery Post Treatment room air  -EL     Pre Patient Position Supine  -EL     Intra Patient Position Standing  -EL     Post Patient Position Supine  -EL     Row Name 12/02/22 9226          Positioning and Restraints    Pre-Treatment Position in bed  -EL     Post Treatment Position bed  -EL     In Bed notified nsg;supine;call light within reach;exit alarm on;encouraged to call for assist  -EL           User Key  (r) = Recorded By, (t) = Taken By, (c) = Cosigned By    Initials Name Provider Type    EL Dain Phipps, PT Physical Therapist               Outcome Measures     Row Name 12/02/22 1325          How much help from another person do you currently need...    Turning from your back to your side while in flat bed without using bedrails? 4  -EL     Moving from lying on back to sitting on the side of a flat bed without bedrails? 4  -EL     Moving to and from a bed to a chair (including a wheelchair)? 3  -EL     Standing up from a chair using your arms (e.g., wheelchair, bedside chair)? 3  -EL     Climbing 3-5 steps with a railing? 3  -EL     To walk in hospital room? 3  -EL     AM-PAC 6 Clicks Score (PT) 20  -EL     Highest level of mobility 6 --> Walked 10 steps or more  -EL     Row Name 12/02/22 6312           Functional Assessment    Outcome Measure Options AM-PAC 6 Clicks Basic Mobility (PT)  -EL           User Key  (r) = Recorded By, (t) = Taken By, (c) = Cosigned By    Initials Name Provider Type    Dain Curran PT Physical Therapist                             Physical Therapy Education     Title: PT OT SLP Therapies (Done)     Topic: Physical Therapy (Done)     Point: Mobility training (Done)     Learning Progress Summary           Patient Acceptance, E,TB, VU by  at 12/2/2022 1338                   Point: Precautions (Done)     Learning Progress Summary           Patient Acceptance, E,TB, VU by  at 12/2/2022 1338                               User Key     Initials Effective Dates Name Provider Type Discipline     06/23/20 -  Dain Phipps PT Physical Therapist PT              PT Recommendation and Plan  Planned Therapy Interventions (PT): balance training, neuromuscular re-education, bed mobility training, transfer training, gait training, patient/family education, strengthening, stair training  Plan of Care Reviewed With: patient  Outcome Evaluation: Pt is a 56 YO F admitted with severe anxiety, amephetamine usage, became obtunded in ER. Pt states she lives with a roommate, typically is independent with all ADLs,  ambulates without an AD and states she has had no recent falls. Pt this date with impaired balance, but no significant LOB, would benefit from RWx usage. Pt ambulated in room with MIN A and has weakness in BLE. Per social work note, anticipate inpatient psych once medically stable. Pt with minor mobility deficits, will continue to follow while admitted, but anticipate pt is safe to d/c to IP psych facility.     Time Calculation:    PT Charges     Row Name 12/02/22 1338             Time Calculation    Start Time 1014  -EL      Stop Time 1029  -EL      Time Calculation (min) 15 min  -EL      PT Received On 12/02/22  -EL      PT - Next Appointment 12/05/22  -EL      PT Goal Re-Cert Due Date  12/16/22  -LIBORIO            User Key  (r) = Recorded By, (t) = Taken By, (c) = Cosigned By    Initials Name Provider Type    Dain Curran, PT Physical Therapist              Therapy Charges for Today     Code Description Service Date Service Provider Modifiers Qty    91002521061 HC PT EVAL MOD COMPLEXITY 3 12/2/2022 Dain Phipps, PT GP 1          PT G-Codes  Outcome Measure Options: AM-PAC 6 Clicks Basic Mobility (PT)  AM-PAC 6 Clicks Score (PT): 20  PT Discharge Summary  Anticipated Discharge Disposition (PT): other (see comments) (Defer to Social Work)    Dain Phipps PT  12/2/2022

## 2022-12-02 NOTE — NURSING NOTE
Patient down to MRI for imaging.  MRI called stating patient did not want the MRI and she is being sent back to the floor.  Notified MD.

## 2022-12-02 NOTE — DISCHARGE SUMMARY
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         Hospital Course     Hospital Course:  Chief Complaint: AMS     History of Present Illness: Soraida Valente is a 57 y.o. female with past medical history of  Anxiety, panic attacks, and bipolar disorderwho presented to [facility] with initial c/o anxiety. Per ER report, she had told EMS that around first of every month becomes extremely anxious over bills. She was responsive upon entering ER but upon evaluation was obtunded and responsive to only painful stimuli.      Upon my exam patient is alert and tearful.  She is tremulous with involuntary movement of twitching her foot and jaw.  She reports is having \"suicidal thoughts\".  She reports ran out of her lorazepam a couple months ago and missed follow-up appointment with psychiatry.  She is only on verapamil at home.  Has ran out her Zyprexa also takes Risperdal injection once a month and took about a week ago.  She is complaining of abdominal pain and distention and mild nausea.  Patient admits to using inhaled methamphetamine a couple times a week.  Patient reports \"we will need to come up with new plan as I have ran out of medications\"     CT was normal. Labs positive for hypokalemia and hyponatremia drug screen positive for " amphetamine/methamphetamine.  Inspect reviewed and patient previously on lorazepam 1 mg that was filled on 8/25/2022 and 9/7/2022 quantity of 60 each time.  Benzodiazepines were negative and the drug screen.       Hospital course and problem list    Plan:   Altered mental status  Resolved  Likely due to illicit drug use  Urine drug screen positive for amphetamines, she did admit to using illicit amphetamine  Head CT negative     Illicit drug use  U tox as above  Psych consult planning for inpatient psych placement  No HI or SI  Bedside sitter     Tachycardia  Resolved  Sinus tachycardia, likely due to above and also some agitation     Amphetamine abuse  Treated with benzodiazepines as needed for agitation     TMG dislocation?  Suggestion on CT head, attempted MRI TMJ protocol however patient refused  Not evident on physical exam however     Left buccal area soft tissue mass  CT with contrast ordered however patient refused this can be followed up as an outpatient with imaging.     DVT PUD prophylaxis     Plan discharge to inpatient psych facility.  Follow-up with PCP as an outpatient.      Reasons For Change In Medications and Indications for New Medications:      Day of Discharge     Vital Signs:  Temp:  [97.5 °F (36.4 °C)-98.3 °F (36.8 °C)] 97.5 °F (36.4 °C)  Heart Rate:  [] 91  Resp:  [14-30] 22  BP: (110-125)/(75-84) 112/78    Physical Exam:  Physical Exam   Physical Exam   Constitutional:  oriented to person, place, and time. No distress.   HENT: Left buccal area small about a centimeter mobile nontender mass no regional lymphadenopathy, oral cavity within normal limits, TMJ and physical exam intact, no tenderness on palpation full range of motion of mandible  Head: Normocephalic and atraumatic.   Eyes: Conjunctivae and EOM are normal. Pupils are equal, round, and reactive to light.   Neck: No JVD present. No thyromegaly present.   Cardiovascular: Normal rate, regular rhythm, normal heart sounds and  intact distal pulses. Exam reveals no gallop and no friction rub.   No murmur heard.  Pulmonary/Chest: Effort normal and breath sounds normal. No stridor. No respiratory distress.  has no wheezes.  has no rales.  exhibits no tenderness.   Abdominal: Soft. Bowel sounds are normal.  no distension and no mass. There is no tenderness. There is no rebound and no guarding. No hernia.   Musculoskeletal: Normal range of motion.   Lymphadenopathy:     no cervical adenopathy.   Neurological:  alert and oriented to person, place, and time. No cranial nerve deficit or sensory deficit. exhibits normal muscle tone.   Skin: No rash noted.  not diaphoretic.   Psychiatric:  normal mood and affect.   Vitals reviewed.        Pertinent  and/or Most Recent Results     LAB RESULTS:      Lab 12/02/22  0232 12/01/22  0409 11/30/22  1801   WBC 8.00 5.80 9.00   HEMOGLOBIN 13.3 13.2 13.2   HEMATOCRIT 41.1 39.6 40.1   PLATELETS 280 287 307   NEUTROS ABS  --  3.70 6.30   LYMPHS ABS  --  1.30 1.70   MONOS ABS  --  0.70 0.90   EOS ABS  --  0.10 0.10   MCV 92.3 91.2 91.0         Lab 12/02/22  0232 12/01/22  0953 12/01/22  0409 11/30/22  1801   SODIUM 137 137 134* 130*   POTASSIUM 4.3 4.4 4.5 2.9*   CHLORIDE 102 103 99 92*   CO2 24.0 24.0 25.0 23.0   ANION GAP 11.0 10.0 10.0 15.0   BUN 6 4* 5* 6   CREATININE 0.59 0.56* 0.60 0.71   EGFR 104.0 105.3 103.5 98.1   GLUCOSE 98 117* 122* 112*   CALCIUM 9.0 8.9 9.0 9.3         Lab 12/02/22  0232 12/01/22  0409   TOTAL PROTEIN 6.2 6.5   ALBUMIN 3.80 4.20   GLOBULIN 2.4 2.3   ALT (SGPT) 16 17   AST (SGOT) 20 22   BILIRUBIN 0.3 0.5   ALK PHOS 113 112                     Brief Urine Lab Results  (Last result in the past 365 days)      Color   Clarity   Blood   Leuk Est   Nitrite   Protein   CREAT   Urine HCG        11/30/22 1802 Yellow   Clear   Negative   Negative   Negative   Negative               Microbiology Results (last 10 days)     ** No results found for the last 240 hours. **          CT Head Without  Contrast    Result Date: 11/30/2022  Impression:  1. No acute intracranial abnormality 2. Asymmetric positioning of the mandibular condyles, evaluate for TMJ dysfunction or dislocation  Electronically Signed By-Will Gilman On:11/30/2022 8:09 PM This report was finalized on [id number] by  Will Gilman, .    XR Abdomen KUB    Result Date: 11/30/2022  Impression: Unremarkable radiographic appearance of the abdomen  Electronically Signed By-Will Gilman On:11/30/2022 10:48 PM This report was finalized on [id number] by  Will Gilman, .                  Labs Pending at Discharge:      Procedures Performed           Consults:   Consults     Date and Time Order Name Status Description    12/1/2022 12:35 AM Inpatient Psychiatrist Consult Completed     11/30/2022  8:27 PM Hospitalist (on-call MD unless specified)              Discharge Details        Discharge Medications      Continue These Medications      Instructions Start Date   albuterol sulfate  (90 Base) MCG/ACT inhaler  Commonly known as: PROVENTIL HFA;VENTOLIN HFA;PROAIR HFA   2 puffs, Inhalation, Every 4 Hours PRN      aspirin 81 MG chewable tablet   ASPIRIN 81 MG CHEW      atorvastatin 10 MG tablet  Commonly known as: LIPITOR   10 mg, Oral, Every Night at Bedtime      Breo Ellipta 100-25 MCG/ACT aerosol powder   Generic drug: Fluticasone Furoate-Vilanterol   1 puff, Inhalation, Daily      losartan-hydrochlorothiazide 50-12.5 MG per tablet  Commonly known as: HYZAAR   1 tablet, Oral, Daily      Metoprolol Tartrate 37.5 MG tablet   1 tablet, Oral, 2 Times Daily      OLANZapine 20 MG tablet  Commonly known as: zyPREXA   20 mg, Oral, 2 Times Daily      omeprazole 20 MG capsule  Commonly known as: priLOSEC   20 mg, Oral, Daily      oxybutynin 5 MG tablet  Commonly known as: DITROPAN   5 mg, Oral, Daily         Stop These Medications    hydrOXYzine pamoate 50 MG capsule  Commonly known as: VISTARIL     lamoTRIgine 100 MG tablet  Commonly known  as: LaMICtal     LORazepam 1 MG tablet  Commonly known as: ATIVAN     nystatin 100,000 unit/mL suspension  Commonly known as: MYCOSTATIN     Perseris 120 MG prefilled syringe  Generic drug: risperiDONE ER            Allergies   Allergen Reactions   • Dimethyl Fumarate Swelling   • Gabapentin Mental Status Change   • Ibuprofen Nausea Only   • Tolmetin Hives         Discharge Disposition: Discharged to psych facility stable condition      Diet:  Hospital:  Diet Order   Procedures   • Diet: Regular/House Diet; Safe Tray; Texture: Regular Texture (IDDSI 7); Fluid Consistency: Thin (IDDSI 0)         Discharge Activity:   Activity Instructions     Activity as Tolerated              CODE STATUS:  Code Status and Medical Interventions:   Ordered at: 12/01/22 0523     Code Status (Patient has no pulse and is not breathing):    CPR (Attempt to Resuscitate)     Medical Interventions (Patient has pulse or is breathing):    Full Support     Release to patient:    Routine Release         No future appointments.    Additional Instructions for the Follow-ups that You Need to Schedule     Discharge Follow-up with PCP   As directed       Currently Documented PCP:    Carlo Gutierrez MD (Inactive)    PCP Phone Number:    None     Follow Up Details: one week               Time spent on Discharge including face to face service: 38 minutes    Signature:   Electronically signed by Rajesh Pina MD, 12/02/22, 2:33 PM EST.

## 2022-12-02 NOTE — DISCHARGE PLACEMENT REQUEST
Soraida Valente (57 y.o. Female)     Date of Birth   1965    Social Security Number       Address   [address] [place]    Home Phone   [phone]    MRN   [id number]       Jewish   Unknown    Marital Status   Single                            Admission Date   11/30/22    Admission Type   Emergency    Admitting Provider   Pita Trevino DO    Attending Provider   Rajesh Pina MD    Department, Room/Bed   [address] MEDICAL INPATIENT, 378/1       Discharge Date       Discharge Disposition   Psychiatric Hospital or Unit (DC - External)    Discharge Destination                              COVID-19,CEPHEID/MIGUEL A,COR/ROSSY/PAD/NELDA/MAD IN-HOUSE(OR EMERGENT/ADD-ON),NP SWAB IN TRANSPORT MEDIA 3-4 HR TAT, RT-PCR - Swab, Nasopharynx [[id number]] (Order [id number])  Order  Date: 12/2/2022 Department: [address] MEDICAL INPATIENT Released By: Penny Pollock Authorizing: Rajesh Pina MD     Reprint Order Requisition    COVID-19,CEPHEID/MIGUEL A,COR/ROSSY/PAD/NELDA/MAD IN-HOUSE(OR EMERGENT/ADD-ON),NP SWAB IN TRANSPORT MEDIA 3-4 HR TAT, RT-PCR - Swab, Nasopharynx (Order #[id number]) on 12/2/22        COVID-19,CEPHEID/MIGUEL A,COR/ROSSY/PAD/NELDA/MAD IN-HOUSE(OR EMERGENT/ADD-ON),NP SWAB IN TRANSPORT MEDIA 3-4 HR TAT, RT-PCR - Swab, Nasopharynx  Order: [id number]   Status: Final result      Visible to patient: No (not released)      Next appt: None     Specimen Information: Nasopharynx; Swab    0 Result Notes  Component   Ref Range & Units    COVID19   Not Detected - Ref. Range Not Detected    Resulting Agency [facility]           Narrative  Performed by: [facility]  Fact sheet for providers: https://www.fda.gov/media/626556/download     Fact sheet for patients: https://www.fda.gov/media/265390/download   Fact sheet for providers: https://www.fda.gov/media/819898/download     Fact sheet for patients: https://www.fda.gov/media/158485/download     Test performed by PCR.       Specimen Collected: 12/02/22 14:45 EST Last Resulted: 12/02/22 16:03 EST        Order Details      View Encounter      Lab and Collection Details      Routing      Result History     View Encounter Conversation           Result Care Coordination      Patient Communication     Not Released  Not seen Back to Top           Order Questions    Question Answer   Previously tested for COVID-19? Unknown   Note: Question from the [place] Department of Health and Human Services based on the CARES Act.   Employed in healthcare setting? Unknown   Note: First responders, front line clinicians, nursing home staff, environmental staff, or therapists in direct contact with patients.  Question required by [place] Department of Health and Human Services based on the CARES Act.   Symptomatic for COVID-19 as defined by CDC? No   Note: Fever or chills, Cough, Shortness of breath or difficulty breathing, Fatigue, Muscle or body aches, Headache, New loss of taste or smell, Sore throat, Congestion or runny nose, Nausea or vomiting, Diarrhea. Question required by [place] Department of Health and Human Services based on CARES Act   Hospitalized for COVID-19? Unknown   Note: Question from the [place] Department of Health and Human Services based on the CARES Act.   Admitted to ICU for COVID-19? Unknown   Note: Question from the [place] Department of Health and Human Services based on the CARES Act.   Resident in a congregate (group) care setting? Unknown   Note: Nursing home, residential care location for people with intellectual and developmental disabilities, psychiatric treatment facility, group home, dormitory, board and care home, homeless shelter, foster care or other setting. Question required by [place] Department of Health and Human Services based on the CARES Act.   Pregnant? No   Note: Question required by [place] Department of Health and Human Services based on the CARES Act.   Release to patient Routine Release                Provider Comment To Patient     Not  Released  Not seen       Result Read / Acknowledged    Acknowledge result  No acknowledgement history exists for this order.         Lab Component SmartPhrase Guide    COVID-19,CEPHEID/MIGUEL A,COR/ROSSY/PAD/NELDA/MAD IN-HOUSE(OR EMERGENT/ADD-ON),NP SWAB IN TRANSPORT MEDIA 3-4 HR TAT, RT-PCR - Swab, Nasopharynx (Order #[id number]) on 12/2/22       Other Results from 11/30/2022     CBC (No Diff)  Final result 12/2/2022    Comprehensive Metabolic Panel  Final result 12/2/2022    Basic Metabolic Panel  Final result 12/1/2022    Comprehensive Metabolic Panel  Final result 12/1/2022    CBC Auto Differential  Final result 12/1/2022    Urine Drug Screen - Urine, Clean Catch  Final result 11/30/2022    Urinalysis With Microscopic If Indicated (No Culture) - Straight Cath  Final result 11/30/2022    Basic Metabolic Panel  Final result 11/30/2022    Acetaminophen Level  Final result 11/30/2022    Ethanol  Final result 11/30/2022    Salicylate Level  Final result 11/30/2022    CBC Auto Differential  Final result 11/30/2022    important suggestion  Warning: Additional results from 11/30/2022 are available but are not displayed in this report.

## 2022-12-02 NOTE — CASE MANAGEMENT/SOCIAL WORK
Social Work Assessment  [facility]     Patient Name: Soraida Valente  MRN: [id number]  Today's Date: 12/2/2022    Admit Date: 11/30/2022     Discharge Needs Assessment     Row Name 12/02/22 1448       Living Environment    People in Home spouse    Name(s) of People in Home Delilah Valente.    Current Living Arrangements apartment    Primary Care Provided by self    Provides Primary Care For no one    Family Caregiver if Needed spouse    Able to Return to Prior Arrangements no    Living Arrangement Comments Patient has a suicidal ideations and will need to admit to inpatient psych agency.       Resource/Environmental Concerns    Resource/Environmental Concerns none    Transportation Concerns none       Transition Planning    Patient/Family Anticipates Transition to other (see comments)  Inpatient Psych facility.    Patient/Family Anticipated Services at Transition ;mental health services;other (see comments)  In patient psych agency    Transportation Anticipated other (see comments)   transportation.       Discharge Needs Assessment    Readmission Within the Last 30 Days no previous admission in last 30 days    Equipment Currently Used at Home walker, standard;nebulizer;shower chair    Concerns to be Addressed suicidal    Concerns Comments Patient has suicidal ideations and a plan.    Anticipated Changes Related to Illness none    Equipment Needed After Discharge none    Outpatient/Agency/Support Group Needs outpatient psychiatric care    Discharge Facility/Level of Care Needs psychiatric facility    Current Discharge Risk psychiatric illness               Discharge Plan     Row Name 12/02/22 5180       Plan    Plan DC Plan: [facility] accepted, pending negative COVID test (ordered, pending). [facility] (72 hrs) start 12/1 @ 4:02pm (expires 12/6 @ 4:01pm).  transport anticipated d/t L[facility]Y.    Patient/Family in Agreement with Plan yes    Plan Comments LSW informed that patient is accepted to [facility] once  LIND[facility] is updated & negative COVID test results, pending (requested from MD). [facility] informed of patient's preference for providers & state they will address upon arrival. Once test results, care coordination to assist with transport arrangements.    Row Name 12/02/22 7992       Plan    Plan D/C Plan: [facility] referral pending. IP Psych. Emergency jail Order (72 hours) initiated 12/01/22 at 4:02pm (expires 12/06/22 at 4:01pm).    Plan Comments LSW met with patient bedside completed CM screen. Discussed with patient [facility] Referral will be completed once MRI was completed. Patient refused MRI.                Expected Discharge Date and Time     Expected Discharge Date Expected Discharge Time    Dec 2, 2022          Demographic Summary     Row Name 12/02/22 1328       General Information    Admission Type observation    Arrived From emergency department    Expected Length of Stay (LOS) to be determined    Referral Source admission list    Preferred Language English    General Information Comments Patient lives with her wife, Delilah of 33 years. Patient is independent at home with ADLs but her wife does have a in-home caretaker. Patient and spouse do not drive.       Contact Information    Permission Granted to Share Info With family/designee    Contact Information Comments Delilah Valente               Functional Status     Row Name 12/02/22 1356       Functional Status    Usual Activity Tolerance good    Current Activity Tolerance moderate    Functional Status Comments Patient is independent with ADLs but does not drive.       Functional Status, IADL    Medications independent    Meal Preparation independent    Housekeeping independent    Laundry independent    Shopping independent    IADL Comments Patient is independent with ADLs but does not drive.       Mental Status    General Appearance WDL WDL       Mental Status Summary    Recent Changes in Mental Status/Cognitive Functioning other (see comments)    Mental  Status Comments Patient reported suicidal ideations and that she had a plan.       Employment/    Employment Status disabled               Psychosocial     Row Name 12/02/22 1429       Values/Beliefs    Spiritual, Cultural Beliefs, Presybeterian Practices, Values that Affect Care no       Behavior WDL    Behavior WDL WDL       Emotion Mood WDL    Emotion/Mood/Affect WDL emotion mood;affect    Affect affect consistent with mood    Emotion/Mood anxious       Speech WDL    Speech WDL WDL       Perceptual State WDL    Perceptual State WDL WDL       Thought Process WDL    Thought Process WDL WDL       Intellectual Performance WDL    Intellectual Performance WDL WDL       Coping/Stress    Major Change/Loss/Stressor mental health condition    Patient Personal Strengths expressive of emotions;expressive of needs    Sources of Support spouse    Reaction to Health Status accepting    Understanding of Condition and Treatment adequate understanding of medical condition;adequate understanding of treatment    Coping/Stress Comments Patient's biggest support is her wife, Delilah Valente.       Developmental Stage (Eriksson's)    Developmental Stage Stage 7 (35-65 years/Middle Adulthood) Generativity vs. Stagnation       C-SSRS (Recent)    Q1 Wished to be Dead (Past Month) yes    Q2 Suicidal Thoughts (Past Month) yes    Q3 Suicidal Thought Method yes    Q4 Suicidal Intent without Specific Plan yes    Q5 Suicide Intent with Specific Plan yes    Q6 Suicide Behavior (Lifetime) yes    Within the past 3 Months? yes    Level of Risk per Screen high risk       Violence Risk    Feels Like Hurting Others no    Previous Attempt to Harm Others no               Abuse/Neglect     Row Name 12/02/22 1443       Personal Safety    Feels Unsafe at Home or Work/School no    Feels Threatened by Someone no    Does Anyone Try to Keep You From Having Contact with Others or Doing Things Outside Your Home? no    Physical Signs of Abuse Present no                Legal     Row Name 12/02/22 1446       Financial/Legal    Source of Income disability    Who Manages Finances if Patient Unable Patient's wife can manage the finances if patient is unable.               Substance Abuse     Row Name 12/02/22 1447       Substance Use    Substance Use Status current street drug/inhalant/medication abuse    Environment Typically Uses Street Drugs alone    Substance Use Comment Reports ongoing Methamphetamine use.       AUDIT-C (Alcohol Use Disorders ID Test)    Q1: How often do you have a drink containing alcohol? Never    Q2: How many drinks containing alcohol do you have on a typical day when you are drinking? None    Q3: How often do you have six or more drinks on one occasion? Never    Audit-C Score 0              Met with patient in room wearing PPE: mask, face shield/goggles, gloves, gown.    Maintained distance greater than six feet and spent less than 15 minutes in the room.      TONI Dee, MSW    Phone: [phone]  Cell: [phone]  Fax: [phone]  [email or web address]

## 2022-12-02 NOTE — PLAN OF CARE
Goal Outcome Evaluation:  Plan of Care Reviewed With: patient           Outcome Evaluation: Pt is a 56 YO F admitted with severe anxiety, amephetamine usage, became obtunded in ER. Pt states she lives with a roommate, typically is independent with all ADLs,  ambulates without an AD and states she has had no recent falls. Pt this date with impaired balance, but no significant LOB, would benefit from RWx usage. Pt ambulated in room with MIN A and has weakness in BLE. Per social work note, anticipate inpatient psych once medically stable. Pt with minor mobility deficits, will continue to follow while admitted, but anticipate pt is safe to d/c to IP psych facility.

## 2022-12-02 NOTE — PROGRESS NOTES
Chief complaint anxiety     Subjective .     History of present illness:  The patient is a 57 y.o. female who was admitted secondary to anxiety. PMHx: bipolar d/o vs schizophrenia, anxiety, panic attacks . Psych consult was requested by BHAVANA Spivey for anxiety and suicidal ideation.   The pt acknowledged long hx of mental illness, was dsd with jessie and bipolar d/o, saw Dr Vance at [facility] in the past, she was on different meds in the past but was stable on zyprexa, Risperidone TRINIDAD and lorazepam. The pt stated her meds have not been refilled on time, she missed her f/u appt. She started feeling more depressed, anxious, expressed SI. She endorsed auditory and visual hallucinations. She says she hears voices and sees people. She feels like she is paranoid all the time, and like everyone is out to get her.   Today the pt remains depressed, very anxious,  Reported increased anxiety and panic attack in radiology department, unable to complete MRI         Past psych hx: bipolar vs schizophrenia, inpt at [facility] 2 months ago,     Social history: Patient lives with her wife. She is a smoker, she does not endorse alcohol use. She does state she uses methamphetamine occasionally, once or twice every 2-3 weeks. She states she uses more when she is out of her medication. Her urine drug screen was positive for amphetamines this admission    Review of Systems   All systems were reviewed and negative except for:  Constitution:  positive for fatigue  Musculoskeletal: positive for  muscle pain and muscle weakness  Behavioral/Psych: positive for  anxiety, depression, hallucinations and suicidal ideations    History       Medications Prior to Admission   Medication Sig Dispense Refill Last Dose   • albuterol sulfate  (90 Base) MCG/ACT inhaler Inhale 2 puffs Every 4 (Four) Hours As Needed.      • atorvastatin (LIPITOR) 10 MG tablet Take 10 mg by mouth every night at bedtime.      • Breo Ellipta 100-25 MCG/INH  inhaler Inhale 1 puff Daily.      • hydrOXYzine pamoate (VISTARIL) 50 MG capsule Take 50 mg by mouth 3 (Three) Times a Day As Needed for Anxiety.      • LORazepam (ATIVAN) 1 MG tablet Take 1 mg by mouth 3 (Three) Times a Day As Needed for Anxiety.      • losartan-hydrochlorothiazide (HYZAAR) 50-12.5 MG per tablet Take 1 tablet by mouth Daily.      • Metoprolol Tartrate 37.5 MG tablet Take 1 tablet by mouth 2 (Two) Times a Day.      • nystatin (MYCOSTATIN) 100,000 unit/mL suspension Swish and swallow 500,000 Units 4 (Four) Times a Day.      • OLANZapine (zyPREXA) 20 MG tablet Take 20 mg by mouth 2 (Two) Times a Day.      • omeprazole (priLOSEC) 20 MG capsule Take 20 mg by mouth Daily.      • oxybutynin (DITROPAN) 5 MG tablet Take 5 mg by mouth Daily.      • risperiDONE ER (Perseris) 120 MG prefilled syringe Inject 120 mg under the skin into the appropriate area as directed Every 30 (Thirty) Days.      • aspirin 81 MG chewable tablet ASPIRIN 81 MG CHEW      • lamoTRIgine (LaMICtal) 100 MG tablet Take 100 mg by mouth 2 (Two) Times a Day.           Scheduled Meds:  atorvastatin, 10 mg, Oral, Nightly  budesonide-formoterol, 2 puff, Inhalation, BID - RT  losartan, 50 mg, Oral, Q24H   And  hydroCHLOROthiazide, 12.5 mg, Oral, Q24H  metoprolol tartrate, 25 mg, Oral, Q12H  OLANZapine, 20 mg, Oral, BID  oxybutynin, 5 mg, Oral, Daily  pantoprazole, 40 mg, Oral, QAM  sodium chloride, 10 mL, Intravenous, Q12H         Continuous Infusions:  dextrose 5 % and sodium chloride 0.45 % with KCl 40 mEq/L, 125 mL/hr, Last Rate: 125 mL/hr (12/02/22 0621)        PRN Meds:  •  LORazepam  •  nitroglycerin  •  ondansetron  •  potassium chloride **OR** potassium chloride **OR** potassium chloride  •  [COMPLETED] Insert Peripheral IV **AND** sodium chloride  •  sodium chloride  •  sodium chloride      Allergies:  Dimethyl fumarate, Gabapentin, Ibuprofen, and Tolmetin      Objective     Vital Signs   /78 (BP Location: Left arm) Comment:  "Nurse took a manual blood pressure after the dinomap failed to do so three times.  Pulse 91   Temp 97.5 °F (36.4 °C) (Oral)   Resp 22   Ht 172.7 cm (68\")   Wt 84.2 kg (185 lb 10 oz)   SpO2 94%   BMI 28.22 kg/m²     Physical Exam:     General Appearance:    In NAD       Mental Status Exam:    Hygiene:   fair  Cooperation:  Evasive  Eye Contact:  Poor  Psychomotor Behavior:  Restless  Affect:  Appropriate  Mood \" depressed and anxious \"   Speech:  Monotone  Thought Progress:  Goal directed and Linear  Thought Content:  paranoid   Suicidal:  Suicidal Ideation  Homicidal:  None  Hallucinations:  Auditory and Visual  Delusion:  Paranoid  Memory:  Deficits  Orientation:  Person, Place and Situation  Reliability:  poor  Insight:  Poor  Judgement:  Poor  Impulse Control:  Fair  Physical/Medical Issues:  Yes      Medications and allergies reviewed     Lab Results   Component Value Date    GLUCOSE 98 12/02/2022    CALCIUM 9.0 12/02/2022     12/02/2022    K 4.3 12/02/2022    CO2 24.0 12/02/2022     12/02/2022    BUN 6 12/02/2022    CREATININE 0.59 12/02/2022    BCR 10.2 12/02/2022    ANIONGAP 11.0 12/02/2022       Last Urine Toxicity     LAST URINE TOXICITY RESULTS Latest Ref Rng & Units 11/30/2022    BARBITURATES SCREEN Negative Negative    BENZODIAZEPINE SCREEN, URINE Negative Negative    COCAINE SCREEN, URINE Negative Negative    METHADONE SCREEN, URINE Negative Negative          No results found for: PHENYTOIN, PHENOBARB, VALPROATE, CBMZ    Lab Results   Component Value Date     12/02/2022    BUN 6 12/02/2022    CREATININE 0.59 12/02/2022    WBC 8.00 12/02/2022       Brief Urine Lab Results  (Last result in the past 365 days)      Color   Clarity   Blood   Leuk Est   Nitrite   Protein   CREAT   Urine HCG        11/30/22 1802 Yellow   Clear   Negative   Negative   Negative   Negative               EKG 11/30/2022     QTc 418  Assessment & Plan       Altered mental status, unspecified " altered mental status type      Assessment: Schizoaffective d/o, depressive type   HAILE , panic attacks   Treatment Plan:   The patient presents with signs and symptoms of schizoaffective disorder with depression. At the time of assessment, the patient is a danger to self due to her active suicidal ideation with a plan to cut her wrists. At this time, the patient requires inpatient psychiatric admission for stabilization of her disorder and her medications.      Suicide precautions initiated and a 72 hour hold was placed and signed from  [facility] expiring 12/6/22 at 4:01pm.     Cont  olanzapine 20 mg BID    lorazepam for anxiety, increase to 1 mg QID PRN      Continue supportive treatment until patient can be transferred to dedicated psychiatric facility.    consult to assist with placement when medically stable     Will continue to follow    Treatment Plan discussed with: Patient and nursing     I discussed the patients findings and my recommendations with patient and nursing staff    I have reviewed and approved the behavioral health treatment plans and problem list. Yes     Referring MD has access to consult report and progress notes in EMR     Ivone Diop MD  12/02/22  13:56 EST

## 2022-12-02 NOTE — PROGRESS NOTES
[facility] Medicine Services Daily Progress Note    Patient Name: Soraida Valente  : 1965  MRN: [id number]  Primary Care Physician:  Carlo Gutierrez MD (Inactive)  Date of admission: 2022      Subjective      Chief Complaint: Altered mental status    Seen examined bedside.  A lot more awake alert this morning.  In no distress.  Sitter at bedside    ROS  Negative apart for HPI      Objective      Vitals:   Temp:  [97.6 °F (36.4 °C)-98.3 °F (36.8 °C)] 97.7 °F (36.5 °C)  Heart Rate:  [] 105  Resp:  [14-30] 30  BP: (110-125)/(75-84) 125/84    Physical Exam   Head atraumatic normocephalic  Neck supple, there is a movable small mass on her left buccal area by her TMJ.  It is tender.  No regional lymphadenopathy  Oral cavity within normal limits  Heart S1-S2, tachycardic, regular no extra heart sounds  Lungs clear to auscultation bilaterally  Abdomen soft, bowel sounds present, slightly distended, nontender  Lower extremities no edema  Neuro AOx3  Eyes PERRLA         Result Review    Result Review:  I have personally reviewed the results from the time of this admission to 2022 11:29 EST and agree with these findings:  [x]  Laboratory  []  Microbiology  [x]  Radiology  [x]  EKG/Telemetry   []  Cardiology/Vascular   []  Pathology  []  Old records  []  Other:          Assessment & Plan      Brief Patient Summary:  Soraida Valente is a 57 y.o. female who presents with altered mental status status post taking illicit drugs    atorvastatin, 10 mg, Oral, Nightly  budesonide-formoterol, 2 puff, Inhalation, BID - RT  losartan, 50 mg, Oral, Q24H   And  hydroCHLOROthiazide, 12.5 mg, Oral, Q24H  metoprolol tartrate, 25 mg, Oral, Q12H  OLANZapine, 20 mg, Oral, BID  oxybutynin, 5 mg, Oral, Daily  pantoprazole, 40 mg, Oral, QAM  sodium chloride, 10 mL, Intravenous, Q12H       dextrose 5 % and sodium chloride 0.45 % with KCl 40 mEq/L, 125 mL/hr, Last Rate: 125 mL/hr (22  0621)         Active Hospital Problems:  Active Hospital Problems    Diagnosis    • **Altered mental status, unspecified altered mental status type      Plan:   Altered mental status  Resolved  Likely due to illicit drug use  Urine drug screen positive for amphetamines, she did admit to using illicit amphetamine  Head CT negative    Illicit drug use  U tox as above  Psych consult planning for inpatient psych placement  No HI or SI  Bedside sitter    Tachycardia  Sinus tachycardia, likely due to above and also some agitation  Likely due to amphetamines    Amphetamine abuse  Use benzodiazepines as needed for agitation    TMG dislocation?  Suggestion on CT head  Not evident on physical exam    Left buccal area soft tissue mass  CT with contrast ordered    DVT PUD prophylaxis    Plan monitor patient, psych evaluation, possible discharge in next 24 to 48 hours.    DVT prophylaxis:  Mechanical DVT prophylaxis orders are present.    CODE STATUS:    Code Status (Patient has no pulse and is not breathing): CPR (Attempt to Resuscitate)  Medical Interventions (Patient has pulse or is breathing): Full Support  Release to patient: Routine Release      Disposition:  I expect patient to be discharged 24 hours.      Electronically signed by Rajesh Pina MD, 12/02/22, 11:29 EST.  [facility] Hospitalist Team

## 2022-12-05 LAB — QT INTERVAL: 306 MS

## 2022-12-05 NOTE — CASE MANAGEMENT/SOCIAL WORK
Case Management Discharge Note      Final Note: [facility].         Selected Continued Care - Discharged on 12/2/2022 Admission date: 11/30/2022 - Discharge disposition: Psychiatric Hospital or Unit (DC - External)    Destination Coordination complete.    Service Provider Selected Services Address Phone Fax Patient Preferred    [facility] [address], [place] [address] [phone] --           Transportation Services  Other: Law Enforcement    Final Discharge Disposition Code: 65 - psychiatric hospital or unit
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
## 2023-01-05 ENCOUNTER — OFFICE VISIT (OUTPATIENT)
Dept: PSYCHIATRY | Facility: CLINIC | Age: 58
End: 2023-01-05
Payer: MEDICARE

## 2023-01-05 DIAGNOSIS — F25.9 SCHIZOAFFECTIVE DISORDER, UNSPECIFIED TYPE: Chronic | ICD-10-CM

## 2023-01-05 DIAGNOSIS — F41.1 GENERALIZED ANXIETY DISORDER: Primary | Chronic | ICD-10-CM

## 2023-01-05 PROCEDURE — 90791 PSYCH DIAGNOSTIC EVALUATION: CPT | Performed by: SOCIAL WORKER

## 2023-03-01 ENCOUNTER — OFFICE VISIT (OUTPATIENT)
Dept: PSYCHIATRY | Facility: CLINIC | Age: 58
End: 2023-03-01
Payer: MEDICARE

## 2023-03-01 VITALS
OXYGEN SATURATION: 95 % | DIASTOLIC BLOOD PRESSURE: 80 MMHG | SYSTOLIC BLOOD PRESSURE: 116 MMHG | WEIGHT: 195.4 LBS | BODY MASS INDEX: 29.71 KG/M2 | HEART RATE: 101 BPM

## 2023-03-01 DIAGNOSIS — F25.1 SCHIZOAFFECTIVE DISORDER, DEPRESSIVE TYPE: Primary | Chronic | ICD-10-CM

## 2023-03-01 DIAGNOSIS — F41.1 GENERALIZED ANXIETY DISORDER: Chronic | ICD-10-CM

## 2023-03-01 PROCEDURE — 99214 OFFICE O/P EST MOD 30 MIN: CPT

## 2023-03-01 RX ORDER — HYDROXYZINE PAMOATE 50 MG/1
50 CAPSULE ORAL 3 TIMES DAILY PRN
COMMUNITY

## 2023-03-01 RX ORDER — RISPERIDONE 2 MG/1
2 TABLET ORAL 2 TIMES DAILY
COMMUNITY
End: 2023-03-01

## 2023-03-01 RX ORDER — MIRTAZAPINE 15 MG/1
15 TABLET, FILM COATED ORAL NIGHTLY
COMMUNITY
End: 2023-03-02 | Stop reason: SDUPTHER

## 2023-03-01 RX ORDER — OLANZAPINE 10 MG/1
1 TABLET ORAL EVERY 12 HOURS SCHEDULED
COMMUNITY
Start: 2023-01-17

## 2023-03-01 RX ORDER — RISPERIDONE 120 MG
120 KIT SUBCUTANEOUS
Qty: 1 EACH | Refills: 6 | Status: SHIPPED | OUTPATIENT
Start: 2023-03-01

## 2023-03-01 NOTE — PROGRESS NOTES
Subjective   Soraida Valente is a 57 y.o. female who presents today for follow-up for psychiatric medication management. Patient is new to this provider, but previously saw KATIANA Solomon.     Chief Complaint:  Follow up for schizoaffective disorder and anxiety.     History of Present Illness:     Patient last saw MARJORIE Noguera in 2021. She was also recently seen by this provider for an emergency department visit in December 2022.     At that time she was having suicidal ideation, and she did go for inpatient stay at [facility].     At today's visit, the patient states she was discharged from [facility] on risperidone tablets, however she had previously been on the Perseris injections and had one left over. She recently had her brother give her that injection, and that has worked well and she wants to go back on the Perseris injections.     She reports she her mood is good. She does not report any depression or anxiety. She does not currently have an SI/HI/AVH.       Patient presents with symptoms and behaviors that are consistent with the following DSM-5 diagnoses:  1. Schizoaffective disorder  2. Generalized anxiety disorder    The following portions of the patient's history were reviewed and updated as appropriate: allergies, current medications, past family history, past medical history, past social history, past surgical history and problem list.    PAST OUTPATIENT TREATMENT  Diagnosis treated:  Affective Disorder, Anxiety/Panic Disorder  Treatment Type:  Psychotherapy, Medication Management  Multiple inpatient hospitalizations  Prior Psychiatric Medications:  Prozac  Pristiq  Seroquel, restless legs  Olanzapine  Buspar, nausea  Ativan   Pamelor  Vistaril  Lamictal  Risperidone  Support Groups:  None  Sequelae Of Mental Disorder:  emotional distress    Interval History  Improved    Side Effects  None      Past Medical History:  Past Medical History:   Diagnosis Date   • ADHD    • Anxiety    • Ataxia     Gait Ataxia    • Chronic fatigue    • Chronic insomnia    • Chronic pain syndrome    • COPD (chronic obstructive pulmonary disease) ([facility])    • Depression    • Fibromyalgia    • Gender identity disorder    • H/O degenerative disc disease    • Hypertension    • Lumbago    • Osteoarthritis    • Schizoaffective disorder ([facility])    • Vitamin D deficiency        Social History:  Social History     Socioeconomic History   • Marital status: Single   Tobacco Use   • Smoking status: Every Day     Packs/day: 1.00     Types: Cigarettes   • Smokeless tobacco: Never   Vaping Use   • Vaping Use: Never used   Substance and Sexual Activity   • Alcohol use: Not Currently   • Drug use: Not Currently     Comment: Tried THC once   • Sexual activity: Defer       Family History:  Family History   Problem Relation Age of Onset   • Anxiety disorder Mother    • Depression Mother        Past Surgical History:  Past Surgical History:   Procedure Laterality Date   • ANKLE OPEN REDUCTION INTERNAL FIXATION  2018   • HYSTERECTOMY  1991       Problem List:  Patient Active Problem List   Diagnosis   • Schizoaffective disorder ([facility])   • Generalized anxiety disorder   • Altered mental status, unspecified altered mental status type       Allergy:   Allergies   Allergen Reactions   • Dimethyl Fumarate Swelling   • Gabapentin Mental Status Change   • Ibuprofen Nausea Only   • Seasonal Ic [Cholestatin] Unknown - Low Severity   • Strawberry Hives   • Tolmetin Hives        Discontinued Medications:  Medications Discontinued During This Encounter   Medication Reason   • OLANZapine (zyPREXA) 20 MG tablet *Therapy completed   • risperiDONE (risperDAL) 2 MG tablet *Therapy completed       Current Medications:   Current Outpatient Medications   Medication Sig Dispense Refill   • albuterol sulfate  (90 Base) MCG/ACT inhaler Inhale 2 puffs Every 4 (Four) Hours As Needed.     • atorvastatin (LIPITOR) 10 MG tablet Take 1 tablet by mouth every night at bedtime.     • Breo  Ellipta 100-25 MCG/INH inhaler Inhale 1 puff Daily.     • hydrOXYzine pamoate (VISTARIL) 50 MG capsule Take 1 capsule by mouth 3 (Three) Times a Day As Needed for Itching.     • losartan-hydrochlorothiazide (HYZAAR) 50-12.5 MG per tablet Take 1 tablet by mouth Daily.     • Metoprolol Tartrate 37.5 MG tablet Take 1 tablet by mouth 2 (Two) Times a Day.     • mirtazapine (REMERON) 15 MG tablet Take 1 tablet by mouth Every Night.     • OLANZapine (zyPREXA) 10 MG tablet Take 1 tablet by mouth Every 12 (Twelve) Hours.     • omeprazole (priLOSEC) 20 MG capsule Take 1 capsule by mouth Daily.     • oxybutynin (DITROPAN) 5 MG tablet Take 1 tablet by mouth Daily.     • aspirin 81 MG chewable tablet ASPIRIN 81 MG CHEW     • risperiDONE ER (Perseris) 120 MG prefilled syringe Inject 120 mg under the skin into the appropriate area as directed Every 30 (Thirty) Days. 1 each 6     No current facility-administered medications for this visit.         Psychological ROS: positive for - anxiety and depression  negative for - behavioral disorder, concentration difficulties, decreased libido, disorientation, hallucinations, hostility, irritability, memory difficulties, mood swings, obsessive thoughts, physical abuse, sexual abuse or sleep disturbances      Physical Exam:   Blood pressure 116/80, pulse 101, weight 88.6 kg (195 lb 6.4 oz), SpO2 95 %.    Mental Status Exam:   Hygiene:   good  Cooperation:  Cooperative  Eye Contact:  Good  Psychomotor Behavior:  Appropriate  Affect:  Appropriate  Mood: normal  Hopelessness: Denies  Speech:  Normal  Thought Process:  Linear  Thought Content:  Normal  Suicidal:  None  Homicidal:  None  Hallucinations:  None  Delusion:  None  Memory:  Intact  Orientation:  Person, Place, Time and Situation  Reliability:  good  Insight:  Good  Judgement:  Good  Impulse Control:  Good  Physical/Medical Issues:  No        PHQ-9 Depression Screening    Little interest or pleasure in doing things? 0-->not at all    Feeling down, depressed, or hopeless? 0-->not at all   Trouble falling or staying asleep, or sleeping too much? 0-->not at all   Feeling tired or having little energy? 0-->not at all   Poor appetite or overeating? 0-->not at all   Feeling bad about yourself - or that you are a failure or have let yourself or your family down? 0-->not at all   Trouble concentrating on things, such as reading the newspaper or watching television? 0-->not at all   Moving or speaking so slowly that other people could have noticed? Or the opposite - being so fidgety or restless that you have been moving around a lot more than usual? 0-->not at all   Thoughts that you would be better off dead, or of hurting yourself in some way? 0-->not at all   PHQ-9 Total Score 0   If you checked off any problems, how difficult have these problems made it for you to do your work, take care of things at home, or get along with other people? not difficult at all        Current every day smoker less than 3 minutes spent counseling Not agreeable to stopping    I advised Soraida of the risks of tobacco use.     Result Review:    Labs:  Admission on 11/30/2022, Discharged on 12/02/2022   Component Date Value Ref Range Status   • QT Interval 11/30/2022 306  ms Final   • Glucose 11/30/2022 112 (H)  65 - 99 mg/dL Final   • BUN 11/30/2022 6  6 - 20 mg/dL Final   • Creatinine 11/30/2022 0.71  0.57 - 1.00 mg/dL Final   • Sodium 11/30/2022 130 (L)  136 - 145 mmol/L Final   • Potassium 11/30/2022 2.9 (L)  3.5 - 5.2 mmol/L Final   • Chloride 11/30/2022 92 (L)  98 - 107 mmol/L Final   • CO2 11/30/2022 23.0  22.0 - 29.0 mmol/L Final   • Calcium 11/30/2022 9.3  8.6 - 10.5 mg/dL Final   • BUN/Creatinine Ratio 11/30/2022 8.5  7.0 - 25.0 Final   • Anion Gap 11/30/2022 15.0  5.0 - 15.0 mmol/L Final   • eGFR 11/30/2022 98.1  >60.0 mL/min/1.73 Final    National Kidney Foundation and American Society of Nephrology (ASN) Task Force recommended calculation based on the Chronic  Kidney Disease Epidemiology Collaboration (CKD-EPI) equation refit without adjustment for race.   • Acetaminophen 11/30/2022 <5.0  0.0 - 30.0 mcg/mL Final   • Ethanol % 11/30/2022 <0.010  % Final   • Amphet/Methamphet, Screen 11/30/2022 Positive (A)  Negative Final   • Barbiturates Screen, Urine 11/30/2022 Negative  Negative Final   • Benzodiazepine Screen, Urine 11/30/2022 Negative  Negative Final   • Cocaine Screen, Urine 11/30/2022 Negative  Negative Final   • Opiate Screen 11/30/2022 Negative  Negative Final   • THC, Screen, Urine 11/30/2022 Negative  Negative Final   • Methadone Screen, Urine 11/30/2022 Negative  Negative Final   • Oxycodone Screen, Urine 11/30/2022 Negative  Negative Final   • Salicylate 11/30/2022 2.0  <=30.0 mg/dL Final   • Color, UA 11/30/2022 Yellow  Yellow, Straw Final   • Appearance, UA 11/30/2022 Clear  Clear Final   • pH, UA 11/30/2022 7.0  5.0 - 8.0 Final   • Specific Gravity, UA 11/30/2022 <=1.005  1.005 - 1.030 Final   • Glucose, UA 11/30/2022 Negative  Negative Final   • Ketones, UA 11/30/2022 Negative  Negative Final   • Bilirubin, UA 11/30/2022 Negative  Negative Final   • Blood, UA 11/30/2022 Negative  Negative Final   • Protein, UA 11/30/2022 Negative  Negative Final   • Leuk Esterase, UA 11/30/2022 Negative  Negative Final   • Nitrite, UA 11/30/2022 Negative  Negative Final   • Urobilinogen, UA 11/30/2022 0.2 E.U./dL  0.2 - 1.0 E.U./dL Final   • WBC 11/30/2022 9.00  3.40 - 10.80 10*3/mm3 Final   • RBC 11/30/2022 4.41  3.77 - 5.28 10*6/mm3 Final   • Hemoglobin 11/30/2022 13.2  12.0 - 15.9 g/dL Final   • Hematocrit 11/30/2022 40.1  34.0 - 46.6 % Final   • MCV 11/30/2022 91.0  79.0 - 97.0 fL Final   • MCH 11/30/2022 30.0  26.6 - 33.0 pg Final   • MCHC 11/30/2022 33.0  31.5 - 35.7 g/dL Final   • RDW 11/30/2022 13.6  12.3 - 15.4 % Final   • RDW-SD 11/30/2022 42.4  37.0 - 54.0 fl Final   • MPV 11/30/2022 7.8  6.0 - 12.0 fL Final   • Platelets 11/30/2022 307  140 - 450 10*3/mm3 Final    • Neutrophil % 11/30/2022 70.1  42.7 - 76.0 % Final   • Lymphocyte % 11/30/2022 18.7 (L)  19.6 - 45.3 % Final   • Monocyte % 11/30/2022 9.9  5.0 - 12.0 % Final   • Eosinophil % 11/30/2022 0.8  0.3 - 6.2 % Final   • Basophil % 11/30/2022 0.5  0.0 - 1.5 % Final   • Neutrophils, Absolute 11/30/2022 6.30  1.70 - 7.00 10*3/mm3 Final   • Lymphocytes, Absolute 11/30/2022 1.70  0.70 - 3.10 10*3/mm3 Final   • Monocytes, Absolute 11/30/2022 0.90  0.10 - 0.90 10*3/mm3 Final   • Eosinophils, Absolute 11/30/2022 0.10  0.00 - 0.40 10*3/mm3 Final   • Basophils, Absolute 11/30/2022 0.00  0.00 - 0.20 10*3/mm3 Final   • nRBC 11/30/2022 0.0  0.0 - 0.2 /100 WBC Final   • Glucose 12/01/2022 122 (H)  65 - 99 mg/dL Final   • BUN 12/01/2022 5 (L)  6 - 20 mg/dL Final   • Creatinine 12/01/2022 0.60  0.57 - 1.00 mg/dL Final   • Sodium 12/01/2022 134 (L)  136 - 145 mmol/L Final   • Potassium 12/01/2022 4.5  3.5 - 5.2 mmol/L Final    Result checked     • Chloride 12/01/2022 99  98 - 107 mmol/L Final   • CO2 12/01/2022 25.0  22.0 - 29.0 mmol/L Final   • Calcium 12/01/2022 9.0  8.6 - 10.5 mg/dL Final   • Total Protein 12/01/2022 6.5  6.0 - 8.5 g/dL Final   • Albumin 12/01/2022 4.20  3.50 - 5.20 g/dL Final   • ALT (SGPT) 12/01/2022 17  1 - 33 U/L Final   • AST (SGOT) 12/01/2022 22  1 - 32 U/L Final   • Alkaline Phosphatase 12/01/2022 112  39 - 117 U/L Final   • Total Bilirubin 12/01/2022 0.5  0.0 - 1.2 mg/dL Final   • Globulin 12/01/2022 2.3  gm/dL Final   • A/G Ratio 12/01/2022 1.8  g/dL Final   • BUN/Creatinine Ratio 12/01/2022 8.3  7.0 - 25.0 Final   • Anion Gap 12/01/2022 10.0  5.0 - 15.0 mmol/L Final   • eGFR 12/01/2022 103.5  >60.0 mL/min/1.73 Final    National Kidney Foundation and American Society of Nephrology (ASN) Task Force recommended calculation based on the Chronic Kidney Disease Epidemiology Collaboration (CKD-EPI) equation refit without adjustment for race.   • WBC 12/01/2022 5.80  3.40 - 10.80 10*3/mm3 Final   • RBC 12/01/2022  4.34  3.77 - 5.28 10*6/mm3 Final   • Hemoglobin 12/01/2022 13.2  12.0 - 15.9 g/dL Final   • Hematocrit 12/01/2022 39.6  34.0 - 46.6 % Final   • MCV 12/01/2022 91.2  79.0 - 97.0 fL Final   • MCH 12/01/2022 30.3  26.6 - 33.0 pg Final   • MCHC 12/01/2022 33.3  31.5 - 35.7 g/dL Final   • RDW 12/01/2022 13.6  12.3 - 15.4 % Final   • RDW-SD 12/01/2022 42.4  37.0 - 54.0 fl Final   • MPV 12/01/2022 8.1  6.0 - 12.0 fL Final   • Platelets 12/01/2022 287  140 - 450 10*3/mm3 Final   • Neutrophil % 12/01/2022 63.9  42.7 - 76.0 % Final   • Lymphocyte % 12/01/2022 22.6  19.6 - 45.3 % Final   • Monocyte % 12/01/2022 11.3  5.0 - 12.0 % Final   • Eosinophil % 12/01/2022 1.8  0.3 - 6.2 % Final   • Basophil % 12/01/2022 0.4  0.0 - 1.5 % Final   • Neutrophils, Absolute 12/01/2022 3.70  1.70 - 7.00 10*3/mm3 Final   • Lymphocytes, Absolute 12/01/2022 1.30  0.70 - 3.10 10*3/mm3 Final   • Monocytes, Absolute 12/01/2022 0.70  0.10 - 0.90 10*3/mm3 Final   • Eosinophils, Absolute 12/01/2022 0.10  0.00 - 0.40 10*3/mm3 Final   • Basophils, Absolute 12/01/2022 0.00  0.00 - 0.20 10*3/mm3 Final   • nRBC 12/01/2022 0.1  0.0 - 0.2 /100 WBC Final   • Glucose 12/01/2022 117 (H)  65 - 99 mg/dL Final   • BUN 12/01/2022 4 (L)  6 - 20 mg/dL Final   • Creatinine 12/01/2022 0.56 (L)  0.57 - 1.00 mg/dL Final   • Sodium 12/01/2022 137  136 - 145 mmol/L Final   • Potassium 12/01/2022 4.4  3.5 - 5.2 mmol/L Final    Slight hemolysis detected by analyzer. Results may be affected.   • Chloride 12/01/2022 103  98 - 107 mmol/L Final   • CO2 12/01/2022 24.0  22.0 - 29.0 mmol/L Final   • Calcium 12/01/2022 8.9  8.6 - 10.5 mg/dL Final   • BUN/Creatinine Ratio 12/01/2022 7.1  7.0 - 25.0 Final   • Anion Gap 12/01/2022 10.0  5.0 - 15.0 mmol/L Final   • eGFR 12/01/2022 105.3  >60.0 mL/min/1.73 Final    National Kidney Foundation and American Society of Nephrology (ASN) Task Force recommended calculation based on the Chronic Kidney Disease Epidemiology Collaboration  (CKD-EPI) equation refit without adjustment for race.   • WBC 12/02/2022 8.00  3.40 - 10.80 10*3/mm3 Final   • RBC 12/02/2022 4.45  3.77 - 5.28 10*6/mm3 Final   • Hemoglobin 12/02/2022 13.3  12.0 - 15.9 g/dL Final   • Hematocrit 12/02/2022 41.1  34.0 - 46.6 % Final   • MCV 12/02/2022 92.3  79.0 - 97.0 fL Final   • MCH 12/02/2022 29.9  26.6 - 33.0 pg Final   • MCHC 12/02/2022 32.4  31.5 - 35.7 g/dL Final   • RDW 12/02/2022 14.0  12.3 - 15.4 % Final   • RDW-SD 12/02/2022 44.2  37.0 - 54.0 fl Final   • MPV 12/02/2022 8.2  6.0 - 12.0 fL Final   • Platelets 12/02/2022 280  140 - 450 10*3/mm3 Final   • Glucose 12/02/2022 98  65 - 99 mg/dL Final   • BUN 12/02/2022 6  6 - 20 mg/dL Final   • Creatinine 12/02/2022 0.59  0.57 - 1.00 mg/dL Final   • Sodium 12/02/2022 137  136 - 145 mmol/L Final   • Potassium 12/02/2022 4.3  3.5 - 5.2 mmol/L Final   • Chloride 12/02/2022 102  98 - 107 mmol/L Final   • CO2 12/02/2022 24.0  22.0 - 29.0 mmol/L Final   • Calcium 12/02/2022 9.0  8.6 - 10.5 mg/dL Final   • Total Protein 12/02/2022 6.2  6.0 - 8.5 g/dL Final   • Albumin 12/02/2022 3.80  3.50 - 5.20 g/dL Final   • ALT (SGPT) 12/02/2022 16  1 - 33 U/L Final   • AST (SGOT) 12/02/2022 20  1 - 32 U/L Final   • Alkaline Phosphatase 12/02/2022 113  39 - 117 U/L Final   • Total Bilirubin 12/02/2022 0.3  0.0 - 1.2 mg/dL Final   • Globulin 12/02/2022 2.4  gm/dL Final   • A/G Ratio 12/02/2022 1.6  g/dL Final   • BUN/Creatinine Ratio 12/02/2022 10.2  7.0 - 25.0 Final   • Anion Gap 12/02/2022 11.0  5.0 - 15.0 mmol/L Final   • eGFR 12/02/2022 104.0  >60.0 mL/min/1.73 Final    National Kidney Foundation and American Society of Nephrology (ASN) Task Force recommended calculation based on the Chronic Kidney Disease Epidemiology Collaboration (CKD-EPI) equation refit without adjustment for race.   • COVID19 12/02/2022 Not Detected  Not Detected - Ref. Range Final       Assessment & Plan   Diagnoses and all orders for this visit:    1. Schizoaffective  disorder, depressive type (HCC) (Primary)  -     risperiDONE ER (Perseris) 120 MG prefilled syringe; Inject 120 mg under the skin into the appropriate area as directed Every 30 (Thirty) Days.  Dispense: 1 each; Refill: 6    Patient to get Perseris injections at pharmacy one time monthly.     Visit Diagnoses:    ICD-10-CM ICD-9-CM   1. Schizoaffective disorder, depressive type (HCC)  F25.1 295.70       TREATMENT PLAN/GOALS: Continue supportive psychotherapy efforts and medications as indicated. Treatment and medication options discussed during today's visit. Patient ackowledged and verbally consented to continue with current treatment plan and was educated on the importance of compliance with treatment and follow-up appointments.    MEDICATION ISSUES:  INSPECT reviewed as expected    Discussed medication options and treatment plan of prescribed medication as well as the risks, benefits, and side effects including potential falls, possible impaired driving and metabolic adversities among others. Patient is agreeable to call the office with any worsening of symptoms or onset of side effects. Patient is agreeable to call 911 or go to the nearest ER should he/she begin having SI/HI. No medication side effects or related complaints today.     MEDS ORDERED DURING VISIT:  New Medications Ordered This Visit   Medications   • risperiDONE ER (Perseris) 120 MG prefilled syringe     Sig: Inject 120 mg under the skin into the appropriate area as directed Every 30 (Thirty) Days.     Dispense:  1 each     Refill:  6     Please administer to patient when medication is dispensed.       Return in about 3 months (around 6/1/2023).         This document has been electronically signed by BHAVANA Guerrero  March 1, 2023 18:55 EST    Part of this note may be an electronic transcription/translation of spoken language to printed text using the Dragon Dictation System.

## 2023-03-02 RX ORDER — OLANZAPINE 10 MG/1
10 TABLET ORAL EVERY 12 HOURS SCHEDULED
Status: CANCELLED | OUTPATIENT
Start: 2023-03-02

## 2023-03-02 RX ORDER — MIRTAZAPINE 15 MG/1
15 TABLET, FILM COATED ORAL NIGHTLY
Qty: 30 TABLET | Refills: 2 | Status: SHIPPED | OUTPATIENT
Start: 2023-03-02

## 2023-03-02 NOTE — TELEPHONE ENCOUNTER
Patient needs Rx for Remeron sent to pharmacy. She said Barry townsend initially prescribed and she is out.

## 2023-03-03 ENCOUNTER — TELEPHONE (OUTPATIENT)
Dept: PSYCHIATRY | Facility: CLINIC | Age: 58
End: 2023-03-03
Payer: MEDICARE

## 2023-03-03 NOTE — TELEPHONE ENCOUNTER
Received fax wanting refill on Remeron 15 mg.  It had been sent to [facility] in [place].  Called patient to clarify, she wanted every thing sent to [place] please.    I called [facility] and gave a verbal to fill as ordered to [facility].

## 2023-05-25 RX ORDER — MIRTAZAPINE 15 MG/1
15 TABLET, FILM COATED ORAL NIGHTLY
Qty: 30 TABLET | Refills: 3 | Status: SHIPPED | OUTPATIENT
Start: 2023-05-25

## 2023-05-25 NOTE — TELEPHONE ENCOUNTER
Rx Refill Note  Requested Prescriptions     Pending Prescriptions Disp Refills   • mirtazapine (REMERON) 15 MG tablet 30 tablet 2     Sig: Take 1 tablet by mouth Every Night.      Last office visit with prescribing clinician: 3/1/2023     Next office visit with prescribing clinician: 6/1/2023     Office Visit with Aarti Martins APRN (03/01/2023)    Delma Renteria  05/25/23, 09:51 EDT

## 2023-05-29 NOTE — PROGRESS NOTES
Subjective   Soraida Valente is a 58 y.o. female who presents today for follow-up for psychiatric medication management.     Chief Complaint:  Follow up for schizoaffective disorder and anxiety.     History of Present Illness:     Medication adjustments last visit:   Started perseris injections.     She is getting Perseris injections. She feels like these are working well. She can tell a huge improvement between the injections and the pills.   No suicidal thoughts. No HI/AVH.   Her partner has been ill. She was released from the hospital recently in the ICU. She is taking care of her at home. She is happy she can be strong for her.   She is on the nicotine patches and she is trying to quit smoking. Her partner had to quit and was told that smoke could be very detrimental to her health, so Soraida wants to quit for her as well. She is doing well with the patches.   Overall, she is doing well and wants to keep medications the same.     Patient presents with symptoms and behaviors that are consistent with the following DSM-5 diagnoses:  1. Schizoaffective disorder  2. Generalized anxiety disorder    The following portions of the patient's history were reviewed and updated as appropriate: allergies, current medications, past family history, past medical history, past social history, past surgical history and problem list.    PAST OUTPATIENT TREATMENT  Diagnosis treated:  Affective Disorder, Anxiety/Panic Disorder  Treatment Type:  Psychotherapy, Medication Management  Multiple inpatient hospitalizations  Prior Psychiatric Medications:  Prozac  Pristiq  Seroquel, restless legs  Olanzapine  Buspar, nausea  Ativan   Pamelor  Vistaril  Lamictal  Risperidone  Support Groups:  None  Sequelae Of Mental Disorder:  emotional distress    Interval History  Improved    Side Effects  None      Past Medical History:  Past Medical History:   Diagnosis Date   • ADHD    • Anxiety    • Ataxia     Gait Ataxia   • Chronic fatigue    •  Chronic insomnia    • Chronic pain syndrome    • COPD (chronic obstructive pulmonary disease)    • Depression    • Fibromyalgia    • Gender identity disorder    • H/O degenerative disc disease    • Hypertension    • Lumbago    • Osteoarthritis    • Schizoaffective disorder    • Vitamin D deficiency        Social History:  Social History     Socioeconomic History   • Marital status: Single   Tobacco Use   • Smoking status: Every Day     Packs/day: 1.00     Types: Cigarettes   • Smokeless tobacco: Never   Vaping Use   • Vaping Use: Never used   Substance and Sexual Activity   • Alcohol use: Not Currently   • Drug use: Not Currently     Comment: Tried THC once   • Sexual activity: Defer       Family History:  Family History   Problem Relation Age of Onset   • Anxiety disorder Mother    • Depression Mother        Past Surgical History:  Past Surgical History:   Procedure Laterality Date   • ANKLE OPEN REDUCTION INTERNAL FIXATION  2018   • HYSTERECTOMY  1991       Problem List:  Patient Active Problem List   Diagnosis   • Schizoaffective disorder   • Generalized anxiety disorder   • Altered mental status, unspecified altered mental status type       Allergy:   Allergies   Allergen Reactions   • Dimethyl Fumarate Swelling   • Gabapentin Mental Status Change   • Ibuprofen Nausea Only   • Seasonal Ic [Cholestatin] Unknown - Low Severity   • Strawberry Hives   • Tolmetin Hives        Discontinued Medications:  Medications Discontinued During This Encounter   Medication Reason   • losartan-hydrochlorothiazide (HYZAAR) 50-12.5 MG per tablet *Therapy completed   • omeprazole (priLOSEC) 20 MG capsule *Therapy completed   • atorvastatin (LIPITOR) 10 MG tablet *Therapy completed   • OLANZapine (zyPREXA) 10 MG tablet    • aspirin 81 MG chewable tablet        Current Medications:   Current Outpatient Medications   Medication Sig Dispense Refill   • albuterol sulfate  (90 Base) MCG/ACT inhaler Inhale 2 puffs Every 4 (Four)  Hours As Needed.     • atorvastatin (LIPITOR) 20 MG tablet Take 1 tablet by mouth Daily.     • Breo Ellipta 100-25 MCG/INH inhaler Inhale 1 puff Daily.     • hydrOXYzine pamoate (VISTARIL) 50 MG capsule Take 1 capsule by mouth 3 (Three) Times a Day As Needed for Itching.     • Metoprolol Tartrate 37.5 MG tablet Take 1 tablet by mouth 2 (Two) Times a Day.     • mirtazapine (REMERON) 15 MG tablet Take 1 tablet by mouth Every Night. 30 tablet 3   • Nicotine Step 1 21 MG/24HR patch      • omeprazole (priLOSEC) 40 MG capsule Take 1 capsule by mouth Daily.     • oxybutynin (DITROPAN) 5 MG tablet Take 1 tablet by mouth Daily.     • risperiDONE ER (Perseris) 120 MG prefilled syringe Inject 120 mg under the skin into the appropriate area as directed Every 30 (Thirty) Days. 1 each 6     No current facility-administered medications for this visit.         Psychological ROS: positive for - anxiety and depression  negative for - behavioral disorder, concentration difficulties, decreased libido, disorientation, hallucinations, hostility, irritability, memory difficulties, mood swings, obsessive thoughts, physical abuse, sexual abuse or sleep disturbances      Physical Exam:   Blood pressure 140/90, pulse 84, SpO2 98 %.    Mental Status Exam:   Hygiene:   good  Cooperation:  Cooperative  Eye Contact:  Good  Psychomotor Behavior:  Appropriate  Affect:  Appropriate  Mood: normal  Hopelessness: Denies  Speech:  Normal  Thought Process:  Linear  Thought Content:  Normal  Suicidal:  None  Homicidal:  None  Hallucinations:  None  Delusion:  None  Memory:  Intact  Orientation:  Person, Place, Time and Situation  Reliability:  good  Insight:  Good  Judgement:  Good  Impulse Control:  Good  Physical/Medical Issues:  No      Mental status exam was reviewed and compared to visit on 3/1/23 and appropriate updates were made.     PHQ-9 Depression Screening    Little interest or pleasure in doing things? 0-->not at all   Feeling down, depressed,  or hopeless? 0-->not at all   Trouble falling or staying asleep, or sleeping too much? 0-->not at all   Feeling tired or having little energy? 0-->not at all   Poor appetite or overeating? 0-->not at all   Feeling bad about yourself - or that you are a failure or have let yourself or your family down? 0-->not at all   Trouble concentrating on things, such as reading the newspaper or watching television? 0-->not at all   Moving or speaking so slowly that other people could have noticed? Or the opposite - being so fidgety or restless that you have been moving around a lot more than usual? 0-->not at all   Thoughts that you would be better off dead, or of hurting yourself in some way? 0-->not at all   PHQ-9 Total Score 0   If you checked off any problems, how difficult have these problems made it for you to do your work, take care of things at home, or get along with other people? not difficult at all        Current every day smoker less than 3 minutes spent counseling Not agreeable to stopping    I advised Soraida of the risks of tobacco use.     Result Review:    Labs:  No visits with results within 3 Month(s) from this visit.   Latest known visit with results is:   Admission on 11/30/2022, Discharged on 12/02/2022   Component Date Value Ref Range Status   • QT Interval 11/30/2022 306  ms Final   • Glucose 11/30/2022 112 (H)  65 - 99 mg/dL Final   • BUN 11/30/2022 6  6 - 20 mg/dL Final   • Creatinine 11/30/2022 0.71  0.57 - 1.00 mg/dL Final   • Sodium 11/30/2022 130 (L)  136 - 145 mmol/L Final   • Potassium 11/30/2022 2.9 (L)  3.5 - 5.2 mmol/L Final   • Chloride 11/30/2022 92 (L)  98 - 107 mmol/L Final   • CO2 11/30/2022 23.0  22.0 - 29.0 mmol/L Final   • Calcium 11/30/2022 9.3  8.6 - 10.5 mg/dL Final   • BUN/Creatinine Ratio 11/30/2022 8.5  7.0 - 25.0 Final   • Anion Gap 11/30/2022 15.0  5.0 - 15.0 mmol/L Final   • eGFR 11/30/2022 98.1  >60.0 mL/min/1.73 Final    National Kidney Foundation and American Society of  Nephrology (ASN) Task Force recommended calculation based on the Chronic Kidney Disease Epidemiology Collaboration (CKD-EPI) equation refit without adjustment for race.   • Acetaminophen 11/30/2022 <5.0  0.0 - 30.0 mcg/mL Final   • Ethanol % 11/30/2022 <0.010  % Final   • Amphet/Methamphet, Screen 11/30/2022 Positive (A)  Negative Final   • Barbiturates Screen, Urine 11/30/2022 Negative  Negative Final   • Benzodiazepine Screen, Urine 11/30/2022 Negative  Negative Final   • Cocaine Screen, Urine 11/30/2022 Negative  Negative Final   • Opiate Screen 11/30/2022 Negative  Negative Final   • THC, Screen, Urine 11/30/2022 Negative  Negative Final   • Methadone Screen, Urine 11/30/2022 Negative  Negative Final   • Oxycodone Screen, Urine 11/30/2022 Negative  Negative Final   • Salicylate 11/30/2022 2.0  <=30.0 mg/dL Final   • Color, UA 11/30/2022 Yellow  Yellow, Straw Final   • Appearance, UA 11/30/2022 Clear  Clear Final   • pH, UA 11/30/2022 7.0  5.0 - 8.0 Final   • Specific Gravity, UA 11/30/2022 <=1.005  1.005 - 1.030 Final   • Glucose, UA 11/30/2022 Negative  Negative Final   • Ketones, UA 11/30/2022 Negative  Negative Final   • Bilirubin, UA 11/30/2022 Negative  Negative Final   • Blood, UA 11/30/2022 Negative  Negative Final   • Protein, UA 11/30/2022 Negative  Negative Final   • Leuk Esterase, UA 11/30/2022 Negative  Negative Final   • Nitrite, UA 11/30/2022 Negative  Negative Final   • Urobilinogen, UA 11/30/2022 0.2 E.U./dL  0.2 - 1.0 E.U./dL Final   • WBC 11/30/2022 9.00  3.40 - 10.80 10*3/mm3 Final   • RBC 11/30/2022 4.41  3.77 - 5.28 10*6/mm3 Final   • Hemoglobin 11/30/2022 13.2  12.0 - 15.9 g/dL Final   • Hematocrit 11/30/2022 40.1  34.0 - 46.6 % Final   • MCV 11/30/2022 91.0  79.0 - 97.0 fL Final   • MCH 11/30/2022 30.0  26.6 - 33.0 pg Final   • MCHC 11/30/2022 33.0  31.5 - 35.7 g/dL Final   • RDW 11/30/2022 13.6  12.3 - 15.4 % Final   • RDW-SD 11/30/2022 42.4  37.0 - 54.0 fl Final   • MPV 11/30/2022 7.8   6.0 - 12.0 fL Final   • Platelets 11/30/2022 307  140 - 450 10*3/mm3 Final   • Neutrophil % 11/30/2022 70.1  42.7 - 76.0 % Final   • Lymphocyte % 11/30/2022 18.7 (L)  19.6 - 45.3 % Final   • Monocyte % 11/30/2022 9.9  5.0 - 12.0 % Final   • Eosinophil % 11/30/2022 0.8  0.3 - 6.2 % Final   • Basophil % 11/30/2022 0.5  0.0 - 1.5 % Final   • Neutrophils, Absolute 11/30/2022 6.30  1.70 - 7.00 10*3/mm3 Final   • Lymphocytes, Absolute 11/30/2022 1.70  0.70 - 3.10 10*3/mm3 Final   • Monocytes, Absolute 11/30/2022 0.90  0.10 - 0.90 10*3/mm3 Final   • Eosinophils, Absolute 11/30/2022 0.10  0.00 - 0.40 10*3/mm3 Final   • Basophils, Absolute 11/30/2022 0.00  0.00 - 0.20 10*3/mm3 Final   • nRBC 11/30/2022 0.0  0.0 - 0.2 /100 WBC Final   • Glucose 12/01/2022 122 (H)  65 - 99 mg/dL Final   • BUN 12/01/2022 5 (L)  6 - 20 mg/dL Final   • Creatinine 12/01/2022 0.60  0.57 - 1.00 mg/dL Final   • Sodium 12/01/2022 134 (L)  136 - 145 mmol/L Final   • Potassium 12/01/2022 4.5  3.5 - 5.2 mmol/L Final    Result checked     • Chloride 12/01/2022 99  98 - 107 mmol/L Final   • CO2 12/01/2022 25.0  22.0 - 29.0 mmol/L Final   • Calcium 12/01/2022 9.0  8.6 - 10.5 mg/dL Final   • Total Protein 12/01/2022 6.5  6.0 - 8.5 g/dL Final   • Albumin 12/01/2022 4.20  3.50 - 5.20 g/dL Final   • ALT (SGPT) 12/01/2022 17  1 - 33 U/L Final   • AST (SGOT) 12/01/2022 22  1 - 32 U/L Final   • Alkaline Phosphatase 12/01/2022 112  39 - 117 U/L Final   • Total Bilirubin 12/01/2022 0.5  0.0 - 1.2 mg/dL Final   • Globulin 12/01/2022 2.3  gm/dL Final   • A/G Ratio 12/01/2022 1.8  g/dL Final   • BUN/Creatinine Ratio 12/01/2022 8.3  7.0 - 25.0 Final   • Anion Gap 12/01/2022 10.0  5.0 - 15.0 mmol/L Final   • eGFR 12/01/2022 103.5  >60.0 mL/min/1.73 Final    National Kidney Foundation and American Society of Nephrology (ASN) Task Force recommended calculation based on the Chronic Kidney Disease Epidemiology Collaboration (CKD-EPI) equation refit without adjustment for  race.   • WBC 12/01/2022 5.80  3.40 - 10.80 10*3/mm3 Final   • RBC 12/01/2022 4.34  3.77 - 5.28 10*6/mm3 Final   • Hemoglobin 12/01/2022 13.2  12.0 - 15.9 g/dL Final   • Hematocrit 12/01/2022 39.6  34.0 - 46.6 % Final   • MCV 12/01/2022 91.2  79.0 - 97.0 fL Final   • MCH 12/01/2022 30.3  26.6 - 33.0 pg Final   • MCHC 12/01/2022 33.3  31.5 - 35.7 g/dL Final   • RDW 12/01/2022 13.6  12.3 - 15.4 % Final   • RDW-SD 12/01/2022 42.4  37.0 - 54.0 fl Final   • MPV 12/01/2022 8.1  6.0 - 12.0 fL Final   • Platelets 12/01/2022 287  140 - 450 10*3/mm3 Final   • Neutrophil % 12/01/2022 63.9  42.7 - 76.0 % Final   • Lymphocyte % 12/01/2022 22.6  19.6 - 45.3 % Final   • Monocyte % 12/01/2022 11.3  5.0 - 12.0 % Final   • Eosinophil % 12/01/2022 1.8  0.3 - 6.2 % Final   • Basophil % 12/01/2022 0.4  0.0 - 1.5 % Final   • Neutrophils, Absolute 12/01/2022 3.70  1.70 - 7.00 10*3/mm3 Final   • Lymphocytes, Absolute 12/01/2022 1.30  0.70 - 3.10 10*3/mm3 Final   • Monocytes, Absolute 12/01/2022 0.70  0.10 - 0.90 10*3/mm3 Final   • Eosinophils, Absolute 12/01/2022 0.10  0.00 - 0.40 10*3/mm3 Final   • Basophils, Absolute 12/01/2022 0.00  0.00 - 0.20 10*3/mm3 Final   • nRBC 12/01/2022 0.1  0.0 - 0.2 /100 WBC Final   • Glucose 12/01/2022 117 (H)  65 - 99 mg/dL Final   • BUN 12/01/2022 4 (L)  6 - 20 mg/dL Final   • Creatinine 12/01/2022 0.56 (L)  0.57 - 1.00 mg/dL Final   • Sodium 12/01/2022 137  136 - 145 mmol/L Final   • Potassium 12/01/2022 4.4  3.5 - 5.2 mmol/L Final    Slight hemolysis detected by analyzer. Results may be affected.   • Chloride 12/01/2022 103  98 - 107 mmol/L Final   • CO2 12/01/2022 24.0  22.0 - 29.0 mmol/L Final   • Calcium 12/01/2022 8.9  8.6 - 10.5 mg/dL Final   • BUN/Creatinine Ratio 12/01/2022 7.1  7.0 - 25.0 Final   • Anion Gap 12/01/2022 10.0  5.0 - 15.0 mmol/L Final   • eGFR 12/01/2022 105.3  >60.0 mL/min/1.73 Final    National Kidney Foundation and American Society of Nephrology (ASN) Task Force recommended  calculation based on the Chronic Kidney Disease Epidemiology Collaboration (CKD-EPI) equation refit without adjustment for race.   • WBC 12/02/2022 8.00  3.40 - 10.80 10*3/mm3 Final   • RBC 12/02/2022 4.45  3.77 - 5.28 10*6/mm3 Final   • Hemoglobin 12/02/2022 13.3  12.0 - 15.9 g/dL Final   • Hematocrit 12/02/2022 41.1  34.0 - 46.6 % Final   • MCV 12/02/2022 92.3  79.0 - 97.0 fL Final   • MCH 12/02/2022 29.9  26.6 - 33.0 pg Final   • MCHC 12/02/2022 32.4  31.5 - 35.7 g/dL Final   • RDW 12/02/2022 14.0  12.3 - 15.4 % Final   • RDW-SD 12/02/2022 44.2  37.0 - 54.0 fl Final   • MPV 12/02/2022 8.2  6.0 - 12.0 fL Final   • Platelets 12/02/2022 280  140 - 450 10*3/mm3 Final   • Glucose 12/02/2022 98  65 - 99 mg/dL Final   • BUN 12/02/2022 6  6 - 20 mg/dL Final   • Creatinine 12/02/2022 0.59  0.57 - 1.00 mg/dL Final   • Sodium 12/02/2022 137  136 - 145 mmol/L Final   • Potassium 12/02/2022 4.3  3.5 - 5.2 mmol/L Final   • Chloride 12/02/2022 102  98 - 107 mmol/L Final   • CO2 12/02/2022 24.0  22.0 - 29.0 mmol/L Final   • Calcium 12/02/2022 9.0  8.6 - 10.5 mg/dL Final   • Total Protein 12/02/2022 6.2  6.0 - 8.5 g/dL Final   • Albumin 12/02/2022 3.80  3.50 - 5.20 g/dL Final   • ALT (SGPT) 12/02/2022 16  1 - 33 U/L Final   • AST (SGOT) 12/02/2022 20  1 - 32 U/L Final   • Alkaline Phosphatase 12/02/2022 113  39 - 117 U/L Final   • Total Bilirubin 12/02/2022 0.3  0.0 - 1.2 mg/dL Final   • Globulin 12/02/2022 2.4  gm/dL Final   • A/G Ratio 12/02/2022 1.6  g/dL Final   • BUN/Creatinine Ratio 12/02/2022 10.2  7.0 - 25.0 Final   • Anion Gap 12/02/2022 11.0  5.0 - 15.0 mmol/L Final   • eGFR 12/02/2022 104.0  >60.0 mL/min/1.73 Final    National Kidney Foundation and American Society of Nephrology (ASN) Task Force recommended calculation based on the Chronic Kidney Disease Epidemiology Collaboration (CKD-EPI) equation refit without adjustment for race.   • COVID19 12/02/2022 Not Detected  Not Detected - Ref. Range Final       Assessment  & Plan   Diagnoses and all orders for this visit:    1. Schizoaffective disorder, depressive type (Primary)    2. Generalized anxiety disorder         Continue perseris injections once monthly.     Visit Diagnoses:    ICD-10-CM ICD-9-CM   1. Schizoaffective disorder, depressive type  F25.1 295.70   2. Generalized anxiety disorder  F41.1 300.02       TREATMENT PLAN/GOALS: Continue supportive psychotherapy efforts and medications as indicated. Treatment and medication options discussed during today's visit. Patient ackowledged and verbally consented to continue with current treatment plan and was educated on the importance of compliance with treatment and follow-up appointments.    MEDICATION ISSUES:  INSPECT reviewed as expected    Discussed medication options and treatment plan of prescribed medication as well as the risks, benefits, and side effects including potential falls, possible impaired driving and metabolic adversities among others. Patient is agreeable to call the office with any worsening of symptoms or onset of side effects. Patient is agreeable to call 911 or go to the nearest ER should he/she begin having SI/HI. No medication side effects or related complaints today.     MEDS ORDERED DURING VISIT:  No orders of the defined types were placed in this encounter.      Return in about 6 months (around 12/1/2023).         This document has been electronically signed by BHAVANA Guerrero  June 1, 2023 14:26 EDT    Part of this note may be an electronic transcription/translation of spoken language to printed text using the Dragon Dictation System.

## 2023-06-01 ENCOUNTER — OFFICE VISIT (OUTPATIENT)
Dept: PSYCHIATRY | Facility: CLINIC | Age: 58
End: 2023-06-01

## 2023-06-01 VITALS — OXYGEN SATURATION: 98 % | DIASTOLIC BLOOD PRESSURE: 90 MMHG | HEART RATE: 84 BPM | SYSTOLIC BLOOD PRESSURE: 140 MMHG

## 2023-06-01 DIAGNOSIS — F25.1 SCHIZOAFFECTIVE DISORDER, DEPRESSIVE TYPE: Primary | Chronic | ICD-10-CM

## 2023-06-01 DIAGNOSIS — F41.1 GENERALIZED ANXIETY DISORDER: Chronic | ICD-10-CM

## 2023-06-01 RX ORDER — ATORVASTATIN CALCIUM 20 MG/1
1 TABLET, FILM COATED ORAL DAILY
COMMUNITY
Start: 2023-05-25

## 2023-06-01 RX ORDER — OMEPRAZOLE 40 MG/1
1 CAPSULE, DELAYED RELEASE ORAL DAILY
COMMUNITY
Start: 2023-05-25

## 2023-06-02 NOTE — PROGRESS NOTES
I have reviewed the notes, assessments, and/or procedures performed by Aarti BATEMAN  , I concur with her/his documentation of Soraida Valente.

## 2023-08-30 DIAGNOSIS — F25.1 SCHIZOAFFECTIVE DISORDER, DEPRESSIVE TYPE: Chronic | ICD-10-CM

## 2023-08-30 NOTE — TELEPHONE ENCOUNTER
Rx Refill Note  Requested Prescriptions     Pending Prescriptions Disp Refills    risperiDONE ER (Perseris) 120 MG prefilled syringe 1 each 6     Sig: Inject 120 mg under the skin into the appropriate area as directed Every 30 (Thirty) Days.    mirtazapine (REMERON) 15 MG tablet 30 tablet 3     Sig: Take 1 tablet by mouth Every Night.        Last office visit with prescribing clinician: 6/1/2023     Next office visit with prescribing clinician: 12/5/2023     Office Visit with Aarti Martins APRN (06/01/2023)     Delma Renteria  08/30/23, 08:37 EDT

## 2023-08-31 RX ORDER — RISPERIDONE 120 MG
120 KIT SUBCUTANEOUS
Qty: 1 EACH | Refills: 6 | Status: SHIPPED | OUTPATIENT
Start: 2023-08-31

## 2023-08-31 RX ORDER — MIRTAZAPINE 15 MG/1
15 TABLET, FILM COATED ORAL NIGHTLY
Qty: 30 TABLET | Refills: 5 | Status: SHIPPED | OUTPATIENT
Start: 2023-08-31

## 2024-01-22 NOTE — PROGRESS NOTES
Subjective   Soraida Valente is a 58 y.o. female who presents today for follow-up for psychiatric medication management.     Chief Complaint:  Follow up for schizoaffective disorder and anxiety.     History of Present Illness:     Medication adjustments last visit:   Continue perseris injections once monthly.     She feels like medications are going well.   She is doing well.  They have a caregiver 9-5 Monday-Friday. This helps with care she was providing for her partner.   Denies any suicidal thoughts.   She is wanting to  quit smoking. She has tried on and off the patch. She states it is difficult to quit.   Overall doing well on current medications.   She would like to do a year follow up, and she will call if she has issues in the meantime.     Patient presents with symptoms and behaviors that are consistent with the following DSM-5 diagnoses:  Schizoaffective disorder  2. Generalized anxiety disorder    The following portions of the patient's history were reviewed and updated as appropriate: allergies, current medications, past family history, past medical history, past social history, past surgical history and problem list.    PAST OUTPATIENT TREATMENT  Diagnosis treated:  Affective Disorder, Anxiety/Panic Disorder  Treatment Type:  Psychotherapy, Medication Management  Multiple inpatient hospitalizations  Prior Psychiatric Medications:  Prozac  Pristiq  Seroquel, restless legs  Olanzapine  Buspar, nausea  Ativan   Pamelor  Vistaril  Lamictal  Risperidone  Support Groups:  None  Sequelae Of Mental Disorder:  emotional distress    Interval History  Improved    Side Effects  None      Past Medical History:  Past Medical History:   Diagnosis Date    ADHD     Anxiety     Ataxia     Gait Ataxia    Chronic fatigue     Chronic insomnia     Chronic pain syndrome     COPD (chronic obstructive pulmonary disease)     Depression     Fibromyalgia     Gender identity disorder     H/O degenerative disc disease      Hypertension     Lumbago     Osteoarthritis     Schizoaffective disorder     Vitamin D deficiency        Social History:  Social History     Socioeconomic History    Marital status: Single   Tobacco Use    Smoking status: Every Day     Packs/day: 1     Types: Cigarettes    Smokeless tobacco: Never   Vaping Use    Vaping Use: Never used   Substance and Sexual Activity    Alcohol use: Not Currently    Drug use: Not Currently     Comment: Tried THC once    Sexual activity: Defer       Family History:  Family History   Problem Relation Age of Onset    Anxiety disorder Mother     Depression Mother        Past Surgical History:  Past Surgical History:   Procedure Laterality Date    ANKLE OPEN REDUCTION INTERNAL FIXATION  2018    HYSTERECTOMY  1991       Problem List:  Patient Active Problem List   Diagnosis    Schizoaffective disorder    Generalized anxiety disorder    Altered mental status, unspecified altered mental status type       Allergy:   Allergies   Allergen Reactions    Dimethyl Fumarate Swelling    Gabapentin Mental Status Change    Ibuprofen Nausea Only    Seasonal Ic [Cholestatin] Unknown - Low Severity    Strawberry Hives    Tolmetin Hives        Discontinued Medications:  Medications Discontinued During This Encounter   Medication Reason    Metoprolol Tartrate 37.5 MG tablet     oxybutynin (DITROPAN) 5 MG tablet     Nicotine Step 1 21 MG/24HR patch     hydrOXYzine pamoate (VISTARIL) 50 MG capsule     risperiDONE ER (Perseris) 120 MG prefilled syringe Reorder    mirtazapine (REMERON) 15 MG tablet Reorder         Current Medications:   Current Outpatient Medications   Medication Sig Dispense Refill    metoprolol tartrate (LOPRESSOR) 25 MG tablet Take 1 tablet by mouth Daily.      mirtazapine (REMERON) 15 MG tablet Take 1 tablet by mouth Every Night. 90 tablet 3    risperiDONE ER (Perseris) 120 MG prefilled syringe Inject 120 mg under the skin into the appropriate area as directed Every 30 (Thirty) Days. 1  each 11    albuterol sulfate  (90 Base) MCG/ACT inhaler Inhale 2 puffs Every 4 (Four) Hours As Needed.      atorvastatin (LIPITOR) 20 MG tablet Take 1 tablet by mouth Daily.      Breo Ellipta 100-25 MCG/INH inhaler Inhale 1 puff Daily.      losartan-hydrochlorothiazide (HYZAAR) 50-12.5 MG per tablet Take 1 tablet by mouth Daily.      omeprazole (priLOSEC) 40 MG capsule Take 1 capsule by mouth Daily.       No current facility-administered medications for this visit.         Psychological ROS: positive for - anxiety and depression  negative for - behavioral disorder, concentration difficulties, decreased libido, disorientation, hallucinations, hostility, irritability, memory difficulties, mood swings, obsessive thoughts, physical abuse, sexual abuse or sleep disturbances      Physical Exam:   There were no vitals taken for this visit.    Mental Status Exam:   Hygiene:   good  Cooperation:  Cooperative  Eye Contact:  Good  Psychomotor Behavior:  Appropriate  Affect:  Appropriate  Mood: normal  Hopelessness: Denies  Speech:  Normal  Thought Process:  Linear  Thought Content:  Normal  Suicidal:  None  Homicidal:  None  Hallucinations:  None  Delusion:  None  Memory:  Intact  Orientation:  Person, Place, Time and Situation  Reliability:  good  Insight:  Good  Judgement:  Good  Impulse Control:  Good  Physical/Medical Issues:  No      Mental status exam was reviewed and compared to visit on 6/1/23 and appropriate updates were made.     PHQ-9 Depression Screening    Little interest or pleasure in doing things? 0-->not at all   Feeling down, depressed, or hopeless? 0-->not at all   Trouble falling or staying asleep, or sleeping too much? 0-->not at all   Feeling tired or having little energy? 0-->not at all   Poor appetite or overeating? 0-->not at all   Feeling bad about yourself - or that you are a failure or have let yourself or your family down? 0-->not at all   Trouble concentrating on things, such as reading the  newspaper or watching television? 0-->not at all   Moving or speaking so slowly that other people could have noticed? Or the opposite - being so fidgety or restless that you have been moving around a lot more than usual? 0-->not at all   Thoughts that you would be better off dead, or of hurting yourself in some way? 0-->not at all   PHQ-9 Total Score 0   If you checked off any problems, how difficult have these problems made it for you to do your work, take care of things at home, or get along with other people?          Current every day smoker less than 3 minutes spent counseling Not agreeable to stopping    I advised Soraida of the risks of tobacco use.     Result Review:    Labs:  No visits with results within 3 Month(s) from this visit.   Latest known visit with results is:   Admission on 11/30/2022, Discharged on 12/02/2022   Component Date Value Ref Range Status    QT Interval 11/30/2022 306  ms Final    Glucose 11/30/2022 112 (H)  65 - 99 mg/dL Final    BUN 11/30/2022 6  6 - 20 mg/dL Final    Creatinine 11/30/2022 0.71  0.57 - 1.00 mg/dL Final    Sodium 11/30/2022 130 (L)  136 - 145 mmol/L Final    Potassium 11/30/2022 2.9 (L)  3.5 - 5.2 mmol/L Final    Chloride 11/30/2022 92 (L)  98 - 107 mmol/L Final    CO2 11/30/2022 23.0  22.0 - 29.0 mmol/L Final    Calcium 11/30/2022 9.3  8.6 - 10.5 mg/dL Final    BUN/Creatinine Ratio 11/30/2022 8.5  7.0 - 25.0 Final    Anion Gap 11/30/2022 15.0  5.0 - 15.0 mmol/L Final    eGFR 11/30/2022 98.1  >60.0 mL/min/1.73 Final    National Kidney Foundation and American Society of Nephrology (ASN) Task Force recommended calculation based on the Chronic Kidney Disease Epidemiology Collaboration (CKD-EPI) equation refit without adjustment for race.    Acetaminophen 11/30/2022 <5.0  0.0 - 30.0 mcg/mL Final    Ethanol % 11/30/2022 <0.010  % Final    Amphet/Methamphet, Screen 11/30/2022 Positive (A)  Negative Final    Barbiturates Screen, Urine 11/30/2022 Negative  Negative Final     Benzodiazepine Screen, Urine 11/30/2022 Negative  Negative Final    Cocaine Screen, Urine 11/30/2022 Negative  Negative Final    Opiate Screen 11/30/2022 Negative  Negative Final    THC, Screen, Urine 11/30/2022 Negative  Negative Final    Methadone Screen, Urine 11/30/2022 Negative  Negative Final    Oxycodone Screen, Urine 11/30/2022 Negative  Negative Final    Salicylate 11/30/2022 2.0  <=30.0 mg/dL Final    Color, UA 11/30/2022 Yellow  Yellow, Straw Final    Appearance, UA 11/30/2022 Clear  Clear Final    pH, UA 11/30/2022 7.0  5.0 - 8.0 Final    Specific Gravity, UA 11/30/2022 <=1.005  1.005 - 1.030 Final    Glucose, UA 11/30/2022 Negative  Negative Final    Ketones, UA 11/30/2022 Negative  Negative Final    Bilirubin, UA 11/30/2022 Negative  Negative Final    Blood, UA 11/30/2022 Negative  Negative Final    Protein, UA 11/30/2022 Negative  Negative Final    Leuk Esterase, UA 11/30/2022 Negative  Negative Final    Nitrite, UA 11/30/2022 Negative  Negative Final    Urobilinogen, UA 11/30/2022 0.2 E.U./dL  0.2 - 1.0 E.U./dL Final    WBC 11/30/2022 9.00  3.40 - 10.80 10*3/mm3 Final    RBC 11/30/2022 4.41  3.77 - 5.28 10*6/mm3 Final    Hemoglobin 11/30/2022 13.2  12.0 - 15.9 g/dL Final    Hematocrit 11/30/2022 40.1  34.0 - 46.6 % Final    MCV 11/30/2022 91.0  79.0 - 97.0 fL Final    MCH 11/30/2022 30.0  26.6 - 33.0 pg Final    MCHC 11/30/2022 33.0  31.5 - 35.7 g/dL Final    RDW 11/30/2022 13.6  12.3 - 15.4 % Final    RDW-SD 11/30/2022 42.4  37.0 - 54.0 fl Final    MPV 11/30/2022 7.8  6.0 - 12.0 fL Final    Platelets 11/30/2022 307  140 - 450 10*3/mm3 Final    Neutrophil % 11/30/2022 70.1  42.7 - 76.0 % Final    Lymphocyte % 11/30/2022 18.7 (L)  19.6 - 45.3 % Final    Monocyte % 11/30/2022 9.9  5.0 - 12.0 % Final    Eosinophil % 11/30/2022 0.8  0.3 - 6.2 % Final    Basophil % 11/30/2022 0.5  0.0 - 1.5 % Final    Neutrophils, Absolute 11/30/2022 6.30  1.70 - 7.00 10*3/mm3 Final    Lymphocytes, Absolute 11/30/2022  1.70  0.70 - 3.10 10*3/mm3 Final    Monocytes, Absolute 11/30/2022 0.90  0.10 - 0.90 10*3/mm3 Final    Eosinophils, Absolute 11/30/2022 0.10  0.00 - 0.40 10*3/mm3 Final    Basophils, Absolute 11/30/2022 0.00  0.00 - 0.20 10*3/mm3 Final    nRBC 11/30/2022 0.0  0.0 - 0.2 /100 WBC Final    Glucose 12/01/2022 122 (H)  65 - 99 mg/dL Final    BUN 12/01/2022 5 (L)  6 - 20 mg/dL Final    Creatinine 12/01/2022 0.60  0.57 - 1.00 mg/dL Final    Sodium 12/01/2022 134 (L)  136 - 145 mmol/L Final    Potassium 12/01/2022 4.5  3.5 - 5.2 mmol/L Final    Result checked      Chloride 12/01/2022 99  98 - 107 mmol/L Final    CO2 12/01/2022 25.0  22.0 - 29.0 mmol/L Final    Calcium 12/01/2022 9.0  8.6 - 10.5 mg/dL Final    Total Protein 12/01/2022 6.5  6.0 - 8.5 g/dL Final    Albumin 12/01/2022 4.20  3.50 - 5.20 g/dL Final    ALT (SGPT) 12/01/2022 17  1 - 33 U/L Final    AST (SGOT) 12/01/2022 22  1 - 32 U/L Final    Alkaline Phosphatase 12/01/2022 112  39 - 117 U/L Final    Total Bilirubin 12/01/2022 0.5  0.0 - 1.2 mg/dL Final    Globulin 12/01/2022 2.3  gm/dL Final    A/G Ratio 12/01/2022 1.8  g/dL Final    BUN/Creatinine Ratio 12/01/2022 8.3  7.0 - 25.0 Final    Anion Gap 12/01/2022 10.0  5.0 - 15.0 mmol/L Final    eGFR 12/01/2022 103.5  >60.0 mL/min/1.73 Final    National Kidney Foundation and American Society of Nephrology (ASN) Task Force recommended calculation based on the Chronic Kidney Disease Epidemiology Collaboration (CKD-EPI) equation refit without adjustment for race.    WBC 12/01/2022 5.80  3.40 - 10.80 10*3/mm3 Final    RBC 12/01/2022 4.34  3.77 - 5.28 10*6/mm3 Final    Hemoglobin 12/01/2022 13.2  12.0 - 15.9 g/dL Final    Hematocrit 12/01/2022 39.6  34.0 - 46.6 % Final    MCV 12/01/2022 91.2  79.0 - 97.0 fL Final    MCH 12/01/2022 30.3  26.6 - 33.0 pg Final    MCHC 12/01/2022 33.3  31.5 - 35.7 g/dL Final    RDW 12/01/2022 13.6  12.3 - 15.4 % Final    RDW-SD 12/01/2022 42.4  37.0 - 54.0 fl Final    MPV 12/01/2022 8.1  6.0  - 12.0 fL Final    Platelets 12/01/2022 287  140 - 450 10*3/mm3 Final    Neutrophil % 12/01/2022 63.9  42.7 - 76.0 % Final    Lymphocyte % 12/01/2022 22.6  19.6 - 45.3 % Final    Monocyte % 12/01/2022 11.3  5.0 - 12.0 % Final    Eosinophil % 12/01/2022 1.8  0.3 - 6.2 % Final    Basophil % 12/01/2022 0.4  0.0 - 1.5 % Final    Neutrophils, Absolute 12/01/2022 3.70  1.70 - 7.00 10*3/mm3 Final    Lymphocytes, Absolute 12/01/2022 1.30  0.70 - 3.10 10*3/mm3 Final    Monocytes, Absolute 12/01/2022 0.70  0.10 - 0.90 10*3/mm3 Final    Eosinophils, Absolute 12/01/2022 0.10  0.00 - 0.40 10*3/mm3 Final    Basophils, Absolute 12/01/2022 0.00  0.00 - 0.20 10*3/mm3 Final    nRBC 12/01/2022 0.1  0.0 - 0.2 /100 WBC Final    Glucose 12/01/2022 117 (H)  65 - 99 mg/dL Final    BUN 12/01/2022 4 (L)  6 - 20 mg/dL Final    Creatinine 12/01/2022 0.56 (L)  0.57 - 1.00 mg/dL Final    Sodium 12/01/2022 137  136 - 145 mmol/L Final    Potassium 12/01/2022 4.4  3.5 - 5.2 mmol/L Final    Slight hemolysis detected by analyzer. Results may be affected.    Chloride 12/01/2022 103  98 - 107 mmol/L Final    CO2 12/01/2022 24.0  22.0 - 29.0 mmol/L Final    Calcium 12/01/2022 8.9  8.6 - 10.5 mg/dL Final    BUN/Creatinine Ratio 12/01/2022 7.1  7.0 - 25.0 Final    Anion Gap 12/01/2022 10.0  5.0 - 15.0 mmol/L Final    eGFR 12/01/2022 105.3  >60.0 mL/min/1.73 Final    National Kidney Foundation and American Society of Nephrology (ASN) Task Force recommended calculation based on the Chronic Kidney Disease Epidemiology Collaboration (CKD-EPI) equation refit without adjustment for race.    WBC 12/02/2022 8.00  3.40 - 10.80 10*3/mm3 Final    RBC 12/02/2022 4.45  3.77 - 5.28 10*6/mm3 Final    Hemoglobin 12/02/2022 13.3  12.0 - 15.9 g/dL Final    Hematocrit 12/02/2022 41.1  34.0 - 46.6 % Final    MCV 12/02/2022 92.3  79.0 - 97.0 fL Final    MCH 12/02/2022 29.9  26.6 - 33.0 pg Final    MCHC 12/02/2022 32.4  31.5 - 35.7 g/dL Final    RDW 12/02/2022 14.0  12.3 -  15.4 % Final    RDW-SD 12/02/2022 44.2  37.0 - 54.0 fl Final    MPV 12/02/2022 8.2  6.0 - 12.0 fL Final    Platelets 12/02/2022 280  140 - 450 10*3/mm3 Final    Glucose 12/02/2022 98  65 - 99 mg/dL Final    BUN 12/02/2022 6  6 - 20 mg/dL Final    Creatinine 12/02/2022 0.59  0.57 - 1.00 mg/dL Final    Sodium 12/02/2022 137  136 - 145 mmol/L Final    Potassium 12/02/2022 4.3  3.5 - 5.2 mmol/L Final    Chloride 12/02/2022 102  98 - 107 mmol/L Final    CO2 12/02/2022 24.0  22.0 - 29.0 mmol/L Final    Calcium 12/02/2022 9.0  8.6 - 10.5 mg/dL Final    Total Protein 12/02/2022 6.2  6.0 - 8.5 g/dL Final    Albumin 12/02/2022 3.80  3.50 - 5.20 g/dL Final    ALT (SGPT) 12/02/2022 16  1 - 33 U/L Final    AST (SGOT) 12/02/2022 20  1 - 32 U/L Final    Alkaline Phosphatase 12/02/2022 113  39 - 117 U/L Final    Total Bilirubin 12/02/2022 0.3  0.0 - 1.2 mg/dL Final    Globulin 12/02/2022 2.4  gm/dL Final    A/G Ratio 12/02/2022 1.6  g/dL Final    BUN/Creatinine Ratio 12/02/2022 10.2  7.0 - 25.0 Final    Anion Gap 12/02/2022 11.0  5.0 - 15.0 mmol/L Final    eGFR 12/02/2022 104.0  >60.0 mL/min/1.73 Final    National Kidney Foundation and American Society of Nephrology (ASN) Task Force recommended calculation based on the Chronic Kidney Disease Epidemiology Collaboration (CKD-EPI) equation refit without adjustment for race.    COVID19 12/02/2022 Not Detected  Not Detected - Ref. Range Final       Assessment & Plan   Diagnoses and all orders for this visit:    1. Insomnia due to mental condition (Primary)  -     mirtazapine (REMERON) 15 MG tablet; Take 1 tablet by mouth Every Night.  Dispense: 90 tablet; Refill: 3    2. Schizoaffective disorder, depressive type  -     risperiDONE ER (Perseris) 120 MG prefilled syringe; Inject 120 mg under the skin into the appropriate area as directed Every 30 (Thirty) Days.  Dispense: 1 each; Refill: 11        Continue perseris injections once monthly. (Her brother is administering these)  Continue  mirtazapine 15mg nightly.    Visit Diagnoses:    ICD-10-CM ICD-9-CM   1. Insomnia due to mental condition  F51.05 300.9     327.02   2. Schizoaffective disorder, depressive type  F25.1 295.70         TREATMENT PLAN/GOALS: Continue supportive psychotherapy efforts and medications as indicated. Treatment and medication options discussed during today's visit. Patient ackowledged and verbally consented to continue with current treatment plan and was educated on the importance of compliance with treatment and follow-up appointments.    MEDICATION ISSUES:  INSPECT reviewed as expected    Discussed medication options and treatment plan of prescribed medication as well as the risks, benefits, and side effects including potential falls, possible impaired driving and metabolic adversities among others. Patient is agreeable to call the office with any worsening of symptoms or onset of side effects. Patient is agreeable to call 911 or go to the nearest ER should he/she begin having SI/HI. No medication side effects or related complaints today.     MEDS ORDERED DURING VISIT:  New Medications Ordered This Visit   Medications    risperiDONE ER (Perseris) 120 MG prefilled syringe     Sig: Inject 120 mg under the skin into the appropriate area as directed Every 30 (Thirty) Days.     Dispense:  1 each     Refill:  11     Please administer to patient when medication is dispensed.    mirtazapine (REMERON) 15 MG tablet     Sig: Take 1 tablet by mouth Every Night.     Dispense:  90 tablet     Refill:  3       Return in about 1 year (around 1/23/2025).         This document has been electronically signed by BHAVANA Guerrero  January 23, 2024 14:53 EST    Part of this note may be an electronic transcription/translation of spoken language to printed text using the Dragon Dictation System.

## 2024-01-23 ENCOUNTER — OFFICE VISIT (OUTPATIENT)
Dept: PSYCHIATRY | Facility: CLINIC | Age: 59
End: 2024-01-23
Payer: MEDICARE

## 2024-01-23 DIAGNOSIS — F25.1 SCHIZOAFFECTIVE DISORDER, DEPRESSIVE TYPE: Chronic | ICD-10-CM

## 2024-01-23 DIAGNOSIS — F51.05 INSOMNIA DUE TO MENTAL CONDITION: Primary | Chronic | ICD-10-CM

## 2024-01-23 PROCEDURE — 99214 OFFICE O/P EST MOD 30 MIN: CPT

## 2024-01-23 PROCEDURE — 1160F RVW MEDS BY RX/DR IN RCRD: CPT

## 2024-01-23 PROCEDURE — 1159F MED LIST DOCD IN RCRD: CPT

## 2024-01-23 RX ORDER — LOSARTAN POTASSIUM AND HYDROCHLOROTHIAZIDE 12.5; 5 MG/1; MG/1
1 TABLET ORAL DAILY
COMMUNITY

## 2024-01-23 RX ORDER — MIRTAZAPINE 15 MG/1
15 TABLET, FILM COATED ORAL NIGHTLY
Qty: 90 TABLET | Refills: 3 | Status: SHIPPED | OUTPATIENT
Start: 2024-01-23

## 2024-01-23 RX ORDER — RISPERIDONE 120 MG
120 KIT SUBCUTANEOUS
Qty: 1 EACH | Refills: 11 | Status: SHIPPED | OUTPATIENT
Start: 2024-01-23

## 2024-02-09 ENCOUNTER — TELEPHONE (OUTPATIENT)
Dept: PSYCHIATRY | Facility: CLINIC | Age: 59
End: 2024-02-09
Payer: MEDICARE

## 2024-02-09 RX ORDER — HYDROXYZINE PAMOATE 25 MG/1
25 CAPSULE ORAL 3 TIMES DAILY PRN
Qty: 90 CAPSULE | Refills: 2 | Status: SHIPPED | OUTPATIENT
Start: 2024-02-09

## 2024-02-09 NOTE — TELEPHONE ENCOUNTER
Patient called to let you know that she has decided that she will try the Vistaril. Please send to [facility] in [place]

## 2024-02-09 NOTE — TELEPHONE ENCOUNTER
Informed patient provider sent in hydroxyzine 25mg three times daily as needed for anxiety.  Informed her that she  needs to be careful when she takes it the first time to make sure it doesn't make her too sleepy. So, don't take it the first time when she has to drive, etc.  She v/u.

## 2024-03-29 ENCOUNTER — PRIOR AUTHORIZATION (OUTPATIENT)
Dept: PSYCHIATRY | Facility: CLINIC | Age: 59
End: 2024-03-29
Payer: MEDICARE

## 2024-03-29 NOTE — TELEPHONE ENCOUNTER
Received letter stating he Perseris is not covered.  Started a PA on Cover my meds. Stated no PA needed.  I called Insurance company. Spoke with Deyanira SAM in PA dept. Medication covered at every 28 days not every 30 days.    Spoke with pharmacy and changed the SIG for the Perseris injection to every 28 days.   Spoke to pharmacistJohn.    Called and spoke with Soraida.

## 2024-04-15 RX ORDER — HYDROXYZINE PAMOATE 25 MG/1
25 CAPSULE ORAL 3 TIMES DAILY PRN
Qty: 90 CAPSULE | Refills: 2 | Status: SHIPPED | OUTPATIENT
Start: 2024-04-15

## 2024-04-15 NOTE — TELEPHONE ENCOUNTER
Rx Refill Note  Requested Prescriptions     Pending Prescriptions Disp Refills    hydrOXYzine pamoate (VISTARIL) 25 MG capsule 90 capsule 2     Sig: Take 1 capsule by mouth 3 (Three) Times a Day As Needed for Anxiety.        Last office visit with prescribing clinician: 1/23/2024     Next office visit with prescribing clinician: 1/28/2025     Office Visit with Aarti Martins APRN (01/23/2024)     Delma Renteria  04/15/24, 14:16 EDT

## 2024-08-23 RX ORDER — HYDROXYZINE PAMOATE 25 MG/1
25 CAPSULE ORAL 3 TIMES DAILY PRN
Qty: 270 CAPSULE | Refills: 1 | Status: SHIPPED | OUTPATIENT
Start: 2024-08-23

## 2024-08-23 NOTE — TELEPHONE ENCOUNTER
Rx Refill Note  Requested Prescriptions     Pending Prescriptions Disp Refills    hydrOXYzine pamoate (VISTARIL) 25 MG capsule 90 capsule 2     Sig: Take 1 capsule by mouth 3 (Three) Times a Day As Needed for Anxiety.      Last office visit with prescribing clinician: 1/23/2024   Last telemedicine visit with prescribing clinician: Visit date not found   Next office visit with prescribing clinician: 1/28/2025   Office Visit with Aarti Martins APRN (01/23/2024)                       Would you like a call back once the refill request has been completed: [] Yes [] No    If the office needs to give you a call back, can they leave a voicemail: [] Yes [] No    Aundrea Arambula  08/23/24, 15:03 EDT

## 2025-02-05 DIAGNOSIS — F25.1 SCHIZOAFFECTIVE DISORDER, DEPRESSIVE TYPE: Chronic | ICD-10-CM

## 2025-02-05 DIAGNOSIS — F51.05 INSOMNIA DUE TO MENTAL CONDITION: Chronic | ICD-10-CM

## 2025-02-05 RX ORDER — MIRTAZAPINE 15 MG/1
15 TABLET, FILM COATED ORAL NIGHTLY
Qty: 90 TABLET | Refills: 3 | Status: SHIPPED | OUTPATIENT
Start: 2025-02-05

## 2025-02-05 RX ORDER — RISPERIDONE 120 MG
120 KIT SUBCUTANEOUS
Qty: 1 EACH | Refills: 11 | Status: SHIPPED | OUTPATIENT
Start: 2025-02-05

## 2025-02-05 RX ORDER — HYDROXYZINE PAMOATE 25 MG/1
25 CAPSULE ORAL 3 TIMES DAILY PRN
Qty: 270 CAPSULE | Refills: 3 | Status: SHIPPED | OUTPATIENT
Start: 2025-02-05

## 2025-02-05 NOTE — TELEPHONE ENCOUNTER
Rx Refill Note  Requested Prescriptions     Pending Prescriptions Disp Refills    hydrOXYzine pamoate (VISTARIL) 25 MG capsule 270 capsule 1     Sig: Take 1 capsule by mouth 3 (Three) Times a Day As Needed for Anxiety.    mirtazapine (REMERON) 15 MG tablet 90 tablet 3     Sig: Take 1 tablet by mouth Every Night.    risperiDONE ER (Perseris) 120 MG prefilled syringe 1 each 11     Sig: Inject 120 mg under the skin into the appropriate area as directed Every 30 (Thirty) Days.        Last office visit with prescribing clinician: 1/23/2024     Next office visit with prescribing clinician: 3/31/2025     Office Visit with Aarti Martins APRN (01/23/2024)     Delma Renteria  02/05/25, 11:40 EST     Asking to continue the 90 day fills

## 2025-03-28 NOTE — PROGRESS NOTES
[facility]   [address], [address]  [place] [address]  [phone]  Aarti Martins, MSN, APRN, PMHNP-BC    NAME: Soraida Valente     : 1965   MRN: [id number]     Patient Care Team:  Carlo Gutierrez MD (Inactive) as PCP - General    DATE: 2025    Subjective     CHIEF COMPLAINT:    HPI:  Soraida Valente, a 59 y.o. female patient seen for follow up for psychiatric medication management.     Patient or patient representative verbalized consent for the use of Ambient Listening during the visit with  BHAVANA Guerrero for chart documentation. 3/31/2025  10:22 EDT    History of Present Illness  The patient is a 59-year-old female who came in for a follow-up on her psychiatric medications for schizoaffective disorder and generalized anxiety disorder. She was last seen on 2024, and had planned for a yearly follow-up.    Recent Loss of Partner  - Recently lost her partner of 36 years on 2025  -  was held last Friday  - Took care of her until the end, but she is grieving. She states she went into cardiac arrest, and EMS worked on her but were not able to get her back.   - Managing stress with the support of her brother  - Seeking a letter to allow her brother to continue residing with her due to housing regulations, as he is helping her with tasks around the house, as well as emotional support.     Housing:   - Lives in an apartment complex for elderly and disabled individuals  - Receives regular meal deliveries and monthly food boxes  - Engages in social activities like shopping trips with her brother or neighbors    Medication and Mental Health  - Currently on Vraylar, hydroxyzine, and Remeron  - Has enough refills  - No suicidal thoughts  - Finds comfort in believing that her partner is no longer suffering  - Acknowledges the need for grief support  - Grateful for the strong support network from her  neighbors    MEDICATIONS  Vraylar, hydroxyzine, Remeron    1/23/24: She feels like medications are going well.   She is doing well.  They have a caregiver 9-5 Monday-Friday. This helps with care she was providing for her partner.   Denies any suicidal thoughts.   She is wanting to  quit smoking. She has tried on and off the patch. She states it is difficult to quit.   Overall doing well on current medications.   She would like to do a year follow up, and she will call if she has issues in the meantime.       PRIOR PSYCH MEDICATIONS:  Prozac  Pristiq  Seroquel, restless legs  Olanzapine  Buspar, nausea  Ativan   Pamelor  Vistaril  Lamictal  Risperidone    PRIOR PSYCH DX:   Schizoaffective disorder  Generalized anxiety disorder     PRIOR MENTAL HEALTH PROVIDERS:  Ericka Noguera PA-C    PSYCH ADMISSIONS:   Raji in 2022 for suicidal ideation       Patient presents with symptoms and behaviors that are consistent with the following DSM-5 diagnoses:  Schizoaffective disorder   2. Generalized anxiety disorder     Objective     There were no vitals taken for this visit.  No LMP recorded. Patient is postmenopausal.    Social History     Occupational History    Not on file   Tobacco Use    Smoking status: Every Day     Current packs/day: 1.00     Types: Cigarettes    Smokeless tobacco: Never   Vaping Use    Vaping status: Never Used   Substance and Sexual Activity    Alcohol use: Not Currently    Drug use: Not Currently     Comment: Tried THC once    Sexual activity: Defer     Family History   Problem Relation Age of Onset    Anxiety disorder Mother     Depression Mother       Past Medical History:   Diagnosis Date    ADHD     Anxiety     Ataxia     Gait Ataxia    Chronic fatigue     Chronic insomnia     Chronic pain syndrome     COPD (chronic obstructive pulmonary disease)     Depression     Fibromyalgia     Gender identity disorder     H/O degenerative disc disease     Hypertension     Lumbago     Osteoarthritis      Schizoaffective disorder     Vitamin D deficiency      Past Surgical History:   Procedure Laterality Date    ANKLE OPEN REDUCTION INTERNAL FIXATION  2018    HYSTERECTOMY  1991      Review of Systems     The following portions of the patient's history were reviewed and updated as appropriate: allergies, current medications, past family history, past medical history, past social history, past surgical history and problem list.    Allergy:   Allergies   Allergen Reactions    Dimethyl Fumarate Swelling    Gabapentin Mental Status Change    Ibuprofen Nausea Only    Seasonal Ic [Cholestatin] Unknown - Low Severity    Strawberry Hives    Tolmetin Hives        Discontinued Medications:  There are no discontinued medications.    Current Medications:   Current Outpatient Medications   Medication Sig Dispense Refill    albuterol sulfate  (90 Base) MCG/ACT inhaler Inhale 2 puffs Every 4 (Four) Hours As Needed.      atorvastatin (LIPITOR) 20 MG tablet Take 1 tablet by mouth Daily.      Breo Ellipta 100-25 MCG/INH inhaler Inhale 1 puff Daily.      hydrOXYzine pamoate (VISTARIL) 25 MG capsule Take 1 capsule by mouth 3 (Three) Times a Day As Needed for Anxiety. 270 capsule 3    losartan-hydrochlorothiazide (HYZAAR) 50-12.5 MG per tablet Take 1 tablet by mouth Daily.      metoprolol tartrate (LOPRESSOR) 25 MG tablet Take 1 tablet by mouth Daily.      mirtazapine (REMERON) 15 MG tablet Take 1 tablet by mouth Every Night. 90 tablet 3    omeprazole (priLOSEC) 40 MG capsule Take 1 capsule by mouth Daily.      risperiDONE ER (Perseris) 120 MG prefilled syringe Inject 120 mg under the skin into the appropriate area as directed Every 30 (Thirty) Days. 1 each 11     No current facility-administered medications for this visit.       MENTAL STATUS EXAM   General Appearance:  Cleanly groomed and dressed  Eye Contact:  Good eye contact  Attitude:  Cooperative  Motor Activity:  Normal gait, posture  Muscle Strength:  Normal  Speech:   Normal rate, tone, volume  Language:  Spontaneous  Mood and affect:  Depressed  Hopelessness:  Denies  Loneliness: Denies  Thought Process:  Logical and goal-directed  Associations/ Thought Content:  No delusions  Hallucinations:  None  Suicidal Ideations:  Not present  Homicidal Ideation:  Not present  Sensorium:  Alert  Orientation:  Person, place and time  Immediate Recall, Recent, and Remote Memory:  Intact  Attention Span/ Concentration:  Good  Fund of Knowledge:  Appropriate for age and educational level  Intellectual Functioning:  Average range  Insight:  Fair  Judgement:  Fair  Reliability:  Fair  Impulse Control:  Fair       PHQ-9 Depression Screening  Little interest or pleasure in doing things? Not at all   Feeling down, depressed, or hopeless? Not at all   PHQ-2 Total Score 0   Trouble falling or staying asleep, or sleeping too much? Not at all   Feeling tired or having little energy? Not at all   Poor appetite or overeating? Not at all   Feeling bad about yourself - or that you are a failure or have let yourself or your family down? Not at all   Trouble concentrating on things, such as reading the newspaper or watching television? Not at all   Moving or speaking so slowly that other people could have noticed? Or the opposite - being so fidgety or restless that you have been moving around a lot more than usual? Not at all   Thoughts that you would be better off dead, or of hurting yourself in some way? Not at all   PHQ-9 Total Score 0   If you checked off any problems, how difficult have these problems made it for you to do your work, take care of things at home, or get along with other people? Not difficult at all     GAD7 Documentation:  Feeling nervous, anxious or on edge 0   Not being able to stop or control worrying 0   Worrying too much about different things 0   Trouble relaxing 0   Being so restless that it is hard to sit still 0   Becoming easily annoyed or irritable 0   Feeling Afraid as if  something awful might happen 0   HAILE Total Score 0   How difficult have these problems made it for you? Not difficult at all     Current every day smoker less than 3 minutes spent counseling Not agreeable to stopping    I advised Soraida of the risks of tobacco use.     Result Review:    Labs:  No visits with results within 3 Month(s) from this visit.   Latest known visit with results is:   Admission on 11/30/2022, Discharged on 12/02/2022   Component Date Value Ref Range Status    QT Interval 11/30/2022 306  ms Final    Glucose 11/30/2022 112 (H)  65 - 99 mg/dL Final    BUN 11/30/2022 6  6 - 20 mg/dL Final    Creatinine 11/30/2022 0.71  0.57 - 1.00 mg/dL Final    Sodium 11/30/2022 130 (L)  136 - 145 mmol/L Final    Potassium 11/30/2022 2.9 (L)  3.5 - 5.2 mmol/L Final    Chloride 11/30/2022 92 (L)  98 - 107 mmol/L Final    CO2 11/30/2022 23.0  22.0 - 29.0 mmol/L Final    Calcium 11/30/2022 9.3  8.6 - 10.5 mg/dL Final    BUN/Creatinine Ratio 11/30/2022 8.5  7.0 - 25.0 Final    Anion Gap 11/30/2022 15.0  5.0 - 15.0 mmol/L Final    eGFR 11/30/2022 98.1  >60.0 mL/min/1.73 Final    National Kidney Foundation and American Society of Nephrology (ASN) Task Force recommended calculation based on the Chronic Kidney Disease Epidemiology Collaboration (CKD-EPI) equation refit without adjustment for race.    Acetaminophen 11/30/2022 <5.0  0.0 - 30.0 mcg/mL Final    Ethanol % 11/30/2022 <0.010  % Final    Amphet/Methamphet, Screen 11/30/2022 Positive (A)  Negative Final    Barbiturates Screen, Urine 11/30/2022 Negative  Negative Final    Benzodiazepine Screen, Urine 11/30/2022 Negative  Negative Final    Cocaine Screen, Urine 11/30/2022 Negative  Negative Final    Opiate Screen 11/30/2022 Negative  Negative Final    THC, Screen, Urine 11/30/2022 Negative  Negative Final    Methadone Screen, Urine 11/30/2022 Negative  Negative Final    Oxycodone Screen, Urine 11/30/2022 Negative  Negative Final    Salicylate 11/30/2022 2.0  <=30.0  mg/dL Final    Color, UA 11/30/2022 Yellow  Yellow, Straw Final    Appearance, UA 11/30/2022 Clear  Clear Final    pH, UA 11/30/2022 7.0  5.0 - 8.0 Final    Specific Gravity, UA 11/30/2022 <=1.005  1.005 - 1.030 Final    Glucose, UA 11/30/2022 Negative  Negative Final    Ketones, UA 11/30/2022 Negative  Negative Final    Bilirubin, UA 11/30/2022 Negative  Negative Final    Blood, UA 11/30/2022 Negative  Negative Final    Protein, UA 11/30/2022 Negative  Negative Final    Leuk Esterase, UA 11/30/2022 Negative  Negative Final    Nitrite, UA 11/30/2022 Negative  Negative Final    Urobilinogen, UA 11/30/2022 0.2 E.U./dL  0.2 - 1.0 E.U./dL Final    WBC 11/30/2022 9.00  3.40 - 10.80 10*3/mm3 Final    RBC 11/30/2022 4.41  3.77 - 5.28 10*6/mm3 Final    Hemoglobin 11/30/2022 13.2  12.0 - 15.9 g/dL Final    Hematocrit 11/30/2022 40.1  34.0 - 46.6 % Final    MCV 11/30/2022 91.0  79.0 - 97.0 fL Final    MCH 11/30/2022 30.0  26.6 - 33.0 pg Final    MCHC 11/30/2022 33.0  31.5 - 35.7 g/dL Final    RDW 11/30/2022 13.6  12.3 - 15.4 % Final    RDW-SD 11/30/2022 42.4  37.0 - 54.0 fl Final    MPV 11/30/2022 7.8  6.0 - 12.0 fL Final    Platelets 11/30/2022 307  140 - 450 10*3/mm3 Final    Neutrophil % 11/30/2022 70.1  42.7 - 76.0 % Final    Lymphocyte % 11/30/2022 18.7 (L)  19.6 - 45.3 % Final    Monocyte % 11/30/2022 9.9  5.0 - 12.0 % Final    Eosinophil % 11/30/2022 0.8  0.3 - 6.2 % Final    Basophil % 11/30/2022 0.5  0.0 - 1.5 % Final    Neutrophils, Absolute 11/30/2022 6.30  1.70 - 7.00 10*3/mm3 Final    Lymphocytes, Absolute 11/30/2022 1.70  0.70 - 3.10 10*3/mm3 Final    Monocytes, Absolute 11/30/2022 0.90  0.10 - 0.90 10*3/mm3 Final    Eosinophils, Absolute 11/30/2022 0.10  0.00 - 0.40 10*3/mm3 Final    Basophils, Absolute 11/30/2022 0.00  0.00 - 0.20 10*3/mm3 Final    nRBC 11/30/2022 0.0  0.0 - 0.2 /100 WBC Final    Glucose 12/01/2022 122 (H)  65 - 99 mg/dL Final    BUN 12/01/2022 5 (L)  6 - 20 mg/dL Final    Creatinine  12/01/2022 0.60  0.57 - 1.00 mg/dL Final    Sodium 12/01/2022 134 (L)  136 - 145 mmol/L Final    Potassium 12/01/2022 4.5  3.5 - 5.2 mmol/L Final    Result checked      Chloride 12/01/2022 99  98 - 107 mmol/L Final    CO2 12/01/2022 25.0  22.0 - 29.0 mmol/L Final    Calcium 12/01/2022 9.0  8.6 - 10.5 mg/dL Final    Total Protein 12/01/2022 6.5  6.0 - 8.5 g/dL Final    Albumin 12/01/2022 4.20  3.50 - 5.20 g/dL Final    ALT (SGPT) 12/01/2022 17  1 - 33 U/L Final    AST (SGOT) 12/01/2022 22  1 - 32 U/L Final    Alkaline Phosphatase 12/01/2022 112  39 - 117 U/L Final    Total Bilirubin 12/01/2022 0.5  0.0 - 1.2 mg/dL Final    Globulin 12/01/2022 2.3  gm/dL Final    A/G Ratio 12/01/2022 1.8  g/dL Final    BUN/Creatinine Ratio 12/01/2022 8.3  7.0 - 25.0 Final    Anion Gap 12/01/2022 10.0  5.0 - 15.0 mmol/L Final    eGFR 12/01/2022 103.5  >60.0 mL/min/1.73 Final    National Kidney Foundation and American Society of Nephrology (ASN) Task Force recommended calculation based on the Chronic Kidney Disease Epidemiology Collaboration (CKD-EPI) equation refit without adjustment for race.    WBC 12/01/2022 5.80  3.40 - 10.80 10*3/mm3 Final    RBC 12/01/2022 4.34  3.77 - 5.28 10*6/mm3 Final    Hemoglobin 12/01/2022 13.2  12.0 - 15.9 g/dL Final    Hematocrit 12/01/2022 39.6  34.0 - 46.6 % Final    MCV 12/01/2022 91.2  79.0 - 97.0 fL Final    MCH 12/01/2022 30.3  26.6 - 33.0 pg Final    MCHC 12/01/2022 33.3  31.5 - 35.7 g/dL Final    RDW 12/01/2022 13.6  12.3 - 15.4 % Final    RDW-SD 12/01/2022 42.4  37.0 - 54.0 fl Final    MPV 12/01/2022 8.1  6.0 - 12.0 fL Final    Platelets 12/01/2022 287  140 - 450 10*3/mm3 Final    Neutrophil % 12/01/2022 63.9  42.7 - 76.0 % Final    Lymphocyte % 12/01/2022 22.6  19.6 - 45.3 % Final    Monocyte % 12/01/2022 11.3  5.0 - 12.0 % Final    Eosinophil % 12/01/2022 1.8  0.3 - 6.2 % Final    Basophil % 12/01/2022 0.4  0.0 - 1.5 % Final    Neutrophils, Absolute 12/01/2022 3.70  1.70 - 7.00 10*3/mm3 Final     Lymphocytes, Absolute 12/01/2022 1.30  0.70 - 3.10 10*3/mm3 Final    Monocytes, Absolute 12/01/2022 0.70  0.10 - 0.90 10*3/mm3 Final    Eosinophils, Absolute 12/01/2022 0.10  0.00 - 0.40 10*3/mm3 Final    Basophils, Absolute 12/01/2022 0.00  0.00 - 0.20 10*3/mm3 Final    nRBC 12/01/2022 0.1  0.0 - 0.2 /100 WBC Final    Glucose 12/01/2022 117 (H)  65 - 99 mg/dL Final    BUN 12/01/2022 4 (L)  6 - 20 mg/dL Final    Creatinine 12/01/2022 0.56 (L)  0.57 - 1.00 mg/dL Final    Sodium 12/01/2022 137  136 - 145 mmol/L Final    Potassium 12/01/2022 4.4  3.5 - 5.2 mmol/L Final    Slight hemolysis detected by analyzer. Results may be affected.    Chloride 12/01/2022 103  98 - 107 mmol/L Final    CO2 12/01/2022 24.0  22.0 - 29.0 mmol/L Final    Calcium 12/01/2022 8.9  8.6 - 10.5 mg/dL Final    BUN/Creatinine Ratio 12/01/2022 7.1  7.0 - 25.0 Final    Anion Gap 12/01/2022 10.0  5.0 - 15.0 mmol/L Final    eGFR 12/01/2022 105.3  >60.0 mL/min/1.73 Final    National Kidney Foundation and American Society of Nephrology (ASN) Task Force recommended calculation based on the Chronic Kidney Disease Epidemiology Collaboration (CKD-EPI) equation refit without adjustment for race.    WBC 12/02/2022 8.00  3.40 - 10.80 10*3/mm3 Final    RBC 12/02/2022 4.45  3.77 - 5.28 10*6/mm3 Final    Hemoglobin 12/02/2022 13.3  12.0 - 15.9 g/dL Final    Hematocrit 12/02/2022 41.1  34.0 - 46.6 % Final    MCV 12/02/2022 92.3  79.0 - 97.0 fL Final    MCH 12/02/2022 29.9  26.6 - 33.0 pg Final    MCHC 12/02/2022 32.4  31.5 - 35.7 g/dL Final    RDW 12/02/2022 14.0  12.3 - 15.4 % Final    RDW-SD 12/02/2022 44.2  37.0 - 54.0 fl Final    MPV 12/02/2022 8.2  6.0 - 12.0 fL Final    Platelets 12/02/2022 280  140 - 450 10*3/mm3 Final    Glucose 12/02/2022 98  65 - 99 mg/dL Final    BUN 12/02/2022 6  6 - 20 mg/dL Final    Creatinine 12/02/2022 0.59  0.57 - 1.00 mg/dL Final    Sodium 12/02/2022 137  136 - 145 mmol/L Final    Potassium 12/02/2022 4.3  3.5 - 5.2 mmol/L  Final    Chloride 12/02/2022 102  98 - 107 mmol/L Final    CO2 12/02/2022 24.0  22.0 - 29.0 mmol/L Final    Calcium 12/02/2022 9.0  8.6 - 10.5 mg/dL Final    Total Protein 12/02/2022 6.2  6.0 - 8.5 g/dL Final    Albumin 12/02/2022 3.80  3.50 - 5.20 g/dL Final    ALT (SGPT) 12/02/2022 16  1 - 33 U/L Final    AST (SGOT) 12/02/2022 20  1 - 32 U/L Final    Alkaline Phosphatase 12/02/2022 113  39 - 117 U/L Final    Total Bilirubin 12/02/2022 0.3  0.0 - 1.2 mg/dL Final    Globulin 12/02/2022 2.4  gm/dL Final    A/G Ratio 12/02/2022 1.6  g/dL Final    BUN/Creatinine Ratio 12/02/2022 10.2  7.0 - 25.0 Final    Anion Gap 12/02/2022 11.0  5.0 - 15.0 mmol/L Final    eGFR 12/02/2022 104.0  >60.0 mL/min/1.73 Final    National Kidney Foundation and American Society of Nephrology (ASN) Task Force recommended calculation based on the Chronic Kidney Disease Epidemiology Collaboration (CKD-EPI) equation refit without adjustment for race.    COVID19 12/02/2022 Not Detected  Not Detected - Ref. Range Final       Assessment & Plan   Diagnoses and all orders for this visit:    1. Schizoaffective disorder, depressive type (Primary)    2. Generalized anxiety disorder    3. Insomnia due to mental condition         Assessment & Plan  1. Schizoaffective disorder: Stable.  - Mood stable prior to partner's passing, no suicidal thoughts.  - Continue current medication regimen: Perseris injections, hydroxyzine, and Remeron.  - Prepare letter for brother's continued residence, beneficial for physical and mental health.  - Recommend grief support groups if ready. Patient currently feels as though she has a good support system, and understands stages of grief from her mother's passing.     2. Generalized anxiety disorder.  - Managing anxiety with current medication regimen, including hydroxyzine.  - Strong support system from brother and neighbors, helping cope with loss.  - Encourage maintaining support system and seeking additional support if  needed.    Follow-up  - Follow up in early February 2026.     Continue perseris injections, 120mg monthly.   Continue hydroxyzine 25mg TID PRN anxiety.   Continue mirtazapine 15mg nightly.     Visit Diagnoses:    ICD-10-CM ICD-9-CM   1. Schizoaffective disorder, depressive type  F25.1 295.70   2. Generalized anxiety disorder  F41.1 300.02   3. Insomnia due to mental condition  F51.05 300.9     327.02       The above listed condition/conditions are some improvement with treatment, moderate intensity.  Pt history, review of systems, medications, allergies, reviewed, patient was screened today for depression/anxiety, PHQ/HAILE scores reviewed.  Most recent vitals/labs reviewed.  Pt was given appropriate time to ask questions and concerns were addressed. A thorough discussion was had that included review of disease process, need for continued monitoring and additional treatment options including use of pharmacological and non-pharmacological approaches to care, decisions were made and agreed upon by patient and provider.   Discussed the risks, benefits, and potential side effects of the medications; patient ackowledged and verbally consented.     TREATMENT PLAN/GOALS: Continue supportive psychotherapy efforts and medications as indicated. Treatment and medication options discussed during today's visit. Patient ackowledged and verbally consented to continue with current treatment plan and was educated on the importance of compliance with treatment and follow-up appointments.    -Short-Term Goals: Patient will be compliant with medication management and note improvement in S/S over the next 4 to 6 weeks or at next scheduled visit.  -Long-Term Goals: Patient will be compliant with the agreed treatment plan including medication regimen & F/U appt's and deny impairment in daily functioning over the next 6 months.      CRISIS RESOURCES:    In the event you have personal crisis, there are several resources to reach someone to  talk with:    988 Suicide and Crisis Lifeline  Call or text 98 or chat 988CloudVolumesline.org  [facility]'s National Helpline  2-241-221-HELP (4357)  Text your zip code to 459579 (HELP4U)  [place]'s Crisis Line  Dial 932, then press 1  Text [phone]    No show policy:  We understand unexpected circumstances arise; however, anytime you miss your appointment we are unable to provide you appropriate care.  In addition, each appointment missed could have been used to provide care for others.  We ask that you call at least 24 hours in advance to cancel or reschedule an appointment. We would like to take this opportunity to remind you of our policy stating patients who miss THREE appointments without cancelling or rescheduling 24 hours in advance of the appointment may be subject to cancellation of any further visits with our clinic. Please call [phone] to reschedule your appointment. If there are reasons that make it difficult for you to keep the appointments, please call and let us know how we can help. Please understand that medication prescribing will not continue without seeing your provider.        MEDICATION ISSUES:  INSPECT reviewed as expected    Discussed medication options and treatment plan of prescribed medication as well as the risks, benefits, and side effects including potential falls, possible impaired driving and metabolic adversities among others. Patient is agreeable to call the office with any worsening of symptoms or onset of side effects. Patient is agreeable to call 911 or go to the nearest ER should he/she begin having SI/HI. No medication side effects or related complaints today.     MEDS ORDERED DURING VISIT:  No orders of the defined types were placed in this encounter.      Return in about 10 months (around 2/1/2026).         This document has been electronically signed by BHAVANA Guerrero  March 31, 2025 11:57 EDT    Some of the data in this electronic note has been brought forward from a  previous encounter, any necessary changes have been made, it has been reviewed by this APRN, and it is accurate.    Part of this note may be an electronic transcription/translation of spoken language to printed text using the Dragon Dictation System.

## 2025-03-31 ENCOUNTER — OFFICE VISIT (OUTPATIENT)
Dept: PSYCHIATRY | Facility: CLINIC | Age: 60
End: 2025-03-31
Payer: MEDICARE

## 2025-03-31 DIAGNOSIS — F51.05 INSOMNIA DUE TO MENTAL CONDITION: Chronic | ICD-10-CM

## 2025-03-31 DIAGNOSIS — F41.1 GENERALIZED ANXIETY DISORDER: Chronic | ICD-10-CM

## 2025-03-31 DIAGNOSIS — F25.1 SCHIZOAFFECTIVE DISORDER, DEPRESSIVE TYPE: Primary | Chronic | ICD-10-CM

## 2025-03-31 PROCEDURE — G2211 COMPLEX E/M VISIT ADD ON: HCPCS

## 2025-03-31 PROCEDURE — 99214 OFFICE O/P EST MOD 30 MIN: CPT

## 2025-03-31 PROCEDURE — 1160F RVW MEDS BY RX/DR IN RCRD: CPT

## 2025-03-31 PROCEDURE — 1159F MED LIST DOCD IN RCRD: CPT

## 2025-04-18 ENCOUNTER — TELEPHONE (OUTPATIENT)
Dept: PSYCHIATRY | Facility: CLINIC | Age: 60
End: 2025-04-18
Payer: MEDICAID

## 2025-04-18 NOTE — TELEPHONE ENCOUNTER
Soraida called asking if there would any way you could give her some Ativan. She is struggling with the loss of her partner pretty bad. The anxiety is overwhelming.  She does not want it long term. She will stop any other medication she needs to, to be able to get the Ativan to help her.    If answer is yes [facility] in [place] please.

## 2025-04-19 RX ORDER — LORAZEPAM 0.5 MG/1
0.5 TABLET ORAL DAILY PRN
Qty: 30 TABLET | Refills: 0 | Status: SHIPPED | OUTPATIENT
Start: 2025-04-19

## 2025-05-23 NOTE — TELEPHONE ENCOUNTER
Rx Refill Note  Requested Prescriptions     Pending Prescriptions Disp Refills    LORazepam (ATIVAN) 0.5 MG tablet [Pharmacy Med Name: lorazepam 0.5 mg tablet] 30 tablet 0     Sig: Take 1 tablet by mouth Daily As Needed for Anxiety.        Last office visit with prescribing clinician: 3/31/2025     Next office visit with prescribing clinician: 2/10/2026     Office Visit with Aarti Martins APRN (03/31/2025)     BEHAVIORAL HEALTH - SCAN - Inspect report, Patti, 5/23/25 (05/23/2025)     Inspect Fill 4/22/25    Delma Renteria  05/23/25, 10:02 EDT

## 2025-05-28 RX ORDER — LORAZEPAM 0.5 MG/1
0.5 TABLET ORAL DAILY PRN
Qty: 30 TABLET | Refills: 0 | Status: SHIPPED | OUTPATIENT
Start: 2025-05-28

## 2025-06-16 RX ORDER — LORAZEPAM 0.5 MG/1
0.5 TABLET ORAL DAILY PRN
Qty: 30 TABLET | Refills: 0 | Status: SHIPPED | OUTPATIENT
Start: 2025-06-16

## 2025-06-16 NOTE — TELEPHONE ENCOUNTER
Rx Refill Note  Requested Prescriptions     Pending Prescriptions Disp Refills    LORazepam (ATIVAN) 0.5 MG tablet [Pharmacy Med Name: lorazepam 0.5 mg tablet] 30 tablet 0     Sig: TAKE 1 TABLET BY MOUTH DAILY AS NEEDED FOR ANXIETY      Last office visit with prescribing clinician: 3/31/2025     Next office visit with prescribing clinician: 2/10/2026     Office Visit with Aarti Martins APRN (03/31/2025)   BEHAVIORAL HEALTH - SCAN - Inspect, laya, 1965, vibha (06/16/2025)     No UDS    Med check - 12-    Last Inspect fill: 5-    Edyta Brantley MA  06/16/25, 11:16 EDT

## 2025-07-25 DIAGNOSIS — F41.1 GENERALIZED ANXIETY DISORDER: Primary | ICD-10-CM

## 2025-07-28 RX ORDER — LORAZEPAM 0.5 MG/1
0.5 TABLET ORAL DAILY PRN
Qty: 30 TABLET | Refills: 0 | Status: SHIPPED | OUTPATIENT
Start: 2025-07-28

## 2025-07-28 NOTE — TELEPHONE ENCOUNTER
Rx Refill Note  Requested Prescriptions     Pending Prescriptions Disp Refills    LORazepam (ATIVAN) 0.5 MG tablet [Pharmacy Med Name: lorazepam 0.5 mg tablet] 30 tablet 0     Sig: TAKE 1 TABLET BY MOUTH DAILY AS NEEDED FOR ANXIETY        Last office visit with prescribing clinician: 3/31/2025     Next office visit with prescribing clinician:     Office Visit with Aarti Martins APRN (03/31/2025)     NO UDS    BEHAVIORAL HEALTH - SCAN - INSPECT REPORT - KIRT SCHAEFFER - 07/28/2025 (07/28/2025)     Inspect Fill Lorazepam 0.5 mg tablet sold on 06/27/2025, Qty of 30 for 30 Days    Qiana Aburto MA  07/28/25, 08:13 EDT

## 2025-08-25 DIAGNOSIS — F41.1 GENERALIZED ANXIETY DISORDER: ICD-10-CM

## 2025-08-26 RX ORDER — LORAZEPAM 0.5 MG/1
0.5 TABLET ORAL DAILY PRN
Qty: 30 TABLET | Refills: 0 | Status: SHIPPED | OUTPATIENT
Start: 2025-08-26